# Patient Record
Sex: FEMALE | Race: WHITE | NOT HISPANIC OR LATINO | URBAN - METROPOLITAN AREA
[De-identification: names, ages, dates, MRNs, and addresses within clinical notes are randomized per-mention and may not be internally consistent; named-entity substitution may affect disease eponyms.]

---

## 2018-01-02 ENCOUNTER — OFFICE VISIT CONVERTED (OUTPATIENT)
Dept: ONCOLOGY | Facility: HOSPITAL | Age: 58
End: 2018-01-02
Attending: NURSE PRACTITIONER

## 2018-01-18 ENCOUNTER — OFFICE VISIT CONVERTED (OUTPATIENT)
Dept: ONCOLOGY | Facility: HOSPITAL | Age: 58
End: 2018-01-18

## 2018-01-25 ENCOUNTER — OFFICE VISIT CONVERTED (OUTPATIENT)
Dept: CARDIOLOGY | Facility: CLINIC | Age: 58
End: 2018-01-25
Attending: INTERNAL MEDICINE

## 2018-02-01 ENCOUNTER — OFFICE VISIT CONVERTED (OUTPATIENT)
Dept: ONCOLOGY | Facility: HOSPITAL | Age: 58
End: 2018-02-01
Attending: NURSE PRACTITIONER

## 2018-02-15 ENCOUNTER — OFFICE VISIT CONVERTED (OUTPATIENT)
Dept: ONCOLOGY | Facility: HOSPITAL | Age: 58
End: 2018-02-15
Attending: NURSE PRACTITIONER

## 2018-03-19 ENCOUNTER — CONVERSION ENCOUNTER (OUTPATIENT)
Dept: FAMILY MEDICINE CLINIC | Facility: CLINIC | Age: 58
End: 2018-03-19

## 2018-03-19 ENCOUNTER — OFFICE VISIT CONVERTED (OUTPATIENT)
Dept: FAMILY MEDICINE CLINIC | Facility: CLINIC | Age: 58
End: 2018-03-19
Attending: NURSE PRACTITIONER

## 2018-03-29 ENCOUNTER — OFFICE VISIT CONVERTED (OUTPATIENT)
Dept: OTHER | Facility: HOSPITAL | Age: 58
End: 2018-03-29
Attending: SURGERY

## 2018-04-18 ENCOUNTER — TELEPHONE CONVERTED (OUTPATIENT)
Dept: ONCOLOGY | Facility: HOSPITAL | Age: 58
End: 2018-04-18

## 2018-04-20 ENCOUNTER — OFFICE VISIT CONVERTED (OUTPATIENT)
Dept: ONCOLOGY | Facility: HOSPITAL | Age: 58
End: 2018-04-20
Attending: INTERNAL MEDICINE

## 2018-05-09 ENCOUNTER — OFFICE VISIT CONVERTED (OUTPATIENT)
Dept: ONCOLOGY | Facility: HOSPITAL | Age: 58
End: 2018-05-09
Attending: INTERNAL MEDICINE

## 2018-05-10 ENCOUNTER — OFFICE VISIT CONVERTED (OUTPATIENT)
Dept: OTHER | Facility: HOSPITAL | Age: 58
End: 2018-05-10
Attending: SURGERY

## 2018-05-21 ENCOUNTER — TELEPHONE CONVERTED (OUTPATIENT)
Dept: ONCOLOGY | Facility: HOSPITAL | Age: 58
End: 2018-05-21

## 2018-05-23 ENCOUNTER — OFFICE VISIT CONVERTED (OUTPATIENT)
Dept: SURGERY | Facility: CLINIC | Age: 58
End: 2018-05-23
Attending: SURGERY

## 2018-05-25 ENCOUNTER — OFFICE VISIT CONVERTED (OUTPATIENT)
Dept: CARDIOLOGY | Facility: CLINIC | Age: 58
End: 2018-05-25
Attending: INTERNAL MEDICINE

## 2018-05-25 ENCOUNTER — TELEPHONE CONVERTED (OUTPATIENT)
Dept: ONCOLOGY | Facility: HOSPITAL | Age: 58
End: 2018-05-25

## 2018-05-31 ENCOUNTER — OFFICE VISIT CONVERTED (OUTPATIENT)
Dept: ONCOLOGY | Facility: HOSPITAL | Age: 58
End: 2018-05-31
Attending: INTERNAL MEDICINE

## 2018-05-31 ENCOUNTER — OFFICE VISIT CONVERTED (OUTPATIENT)
Dept: SURGERY | Facility: CLINIC | Age: 58
End: 2018-05-31
Attending: SURGERY

## 2021-05-16 VITALS
HEART RATE: 88 BPM | DIASTOLIC BLOOD PRESSURE: 66 MMHG | BODY MASS INDEX: 44.26 KG/M2 | SYSTOLIC BLOOD PRESSURE: 98 MMHG | HEIGHT: 67 IN | WEIGHT: 282 LBS

## 2021-05-16 VITALS — WEIGHT: 272 LBS | BODY MASS INDEX: 42.69 KG/M2 | HEIGHT: 67 IN | RESPIRATION RATE: 16 BRPM

## 2021-05-16 VITALS
DIASTOLIC BLOOD PRESSURE: 40 MMHG | HEIGHT: 67 IN | OXYGEN SATURATION: 98 % | WEIGHT: 272.5 LBS | TEMPERATURE: 99 F | HEART RATE: 84 BPM | SYSTOLIC BLOOD PRESSURE: 108 MMHG | RESPIRATION RATE: 16 BRPM | BODY MASS INDEX: 42.77 KG/M2

## 2021-05-16 VITALS
HEIGHT: 67 IN | SYSTOLIC BLOOD PRESSURE: 90 MMHG | DIASTOLIC BLOOD PRESSURE: 60 MMHG | BODY MASS INDEX: 39.08 KG/M2 | HEART RATE: 88 BPM | WEIGHT: 249 LBS

## 2021-05-16 VITALS — RESPIRATION RATE: 18 BRPM | HEIGHT: 67 IN | WEIGHT: 249 LBS | BODY MASS INDEX: 39.08 KG/M2

## 2021-05-16 VITALS — WEIGHT: 273 LBS | BODY MASS INDEX: 42.85 KG/M2 | HEIGHT: 67 IN | RESPIRATION RATE: 18 BRPM

## 2021-05-28 VITALS
BODY MASS INDEX: 44.58 KG/M2 | OXYGEN SATURATION: 98 % | HEIGHT: 67 IN | RESPIRATION RATE: 16 BRPM | WEIGHT: 293 LBS | SYSTOLIC BLOOD PRESSURE: 122 MMHG | OXYGEN SATURATION: 97 % | OXYGEN SATURATION: 96 % | BODY MASS INDEX: 45.99 KG/M2 | DIASTOLIC BLOOD PRESSURE: 50 MMHG | HEIGHT: 66 IN | HEART RATE: 76 BPM | SYSTOLIC BLOOD PRESSURE: 113 MMHG | HEIGHT: 67 IN | WEIGHT: 283.51 LBS | SYSTOLIC BLOOD PRESSURE: 114 MMHG | TEMPERATURE: 98.3 F | RESPIRATION RATE: 16 BRPM | HEART RATE: 120 BPM | DIASTOLIC BLOOD PRESSURE: 38 MMHG | TEMPERATURE: 98.1 F | TEMPERATURE: 98.5 F | BODY MASS INDEX: 45.56 KG/M2 | DIASTOLIC BLOOD PRESSURE: 54 MMHG | HEART RATE: 84 BPM

## 2021-05-28 VITALS
OXYGEN SATURATION: 98 % | BODY MASS INDEX: 45.64 KG/M2 | HEIGHT: 67 IN | HEART RATE: 78 BPM | SYSTOLIC BLOOD PRESSURE: 104 MMHG | WEIGHT: 290.79 LBS | TEMPERATURE: 98 F | DIASTOLIC BLOOD PRESSURE: 52 MMHG | RESPIRATION RATE: 16 BRPM

## 2021-05-28 VITALS
HEIGHT: 66 IN | OXYGEN SATURATION: 95 % | DIASTOLIC BLOOD PRESSURE: 53 MMHG | TEMPERATURE: 97.6 F | SYSTOLIC BLOOD PRESSURE: 104 MMHG | BODY MASS INDEX: 42.84 KG/M2 | TEMPERATURE: 99.1 F | HEIGHT: 66 IN | DIASTOLIC BLOOD PRESSURE: 54 MMHG | HEART RATE: 69 BPM | BODY MASS INDEX: 39.9 KG/M2 | TEMPERATURE: 97.6 F | HEART RATE: 82 BPM | OXYGEN SATURATION: 99 % | HEART RATE: 71 BPM | WEIGHT: 266.54 LBS | WEIGHT: 248.24 LBS | BODY MASS INDEX: 44.29 KG/M2 | OXYGEN SATURATION: 96 % | SYSTOLIC BLOOD PRESSURE: 105 MMHG | HEIGHT: 66 IN | DIASTOLIC BLOOD PRESSURE: 50 MMHG | WEIGHT: 275.57 LBS | SYSTOLIC BLOOD PRESSURE: 114 MMHG

## 2021-05-28 NOTE — H&P
"Patient: AFTAB BALTAZAR     Acct: [id number]     Report: #[id number]  UNIT #: [id number]     : 1960    Encounter Date:2018  PRIMARY CARE: LUCIANA LIANG [facility]  ***Signed***  --------------------------------------------------------------------------------------------------------------------  Chief Complaint      2018      LEFT BREAST CANCER HORMONE RECEPTOR POSITIVE, HER 2 LEIF NEGATIVE.            Vitals      Height 66.69 in / 169.39 cm      Weight 290 lbs 12.588 oz / 131.9 kg      BSA 2.56 m2      BMI 46.0 kg/m2      Temperature 98.0 F / 36.67 C - Temporal      Pulse 78      Respirations 16      Blood Pressure 104/52      Pulse Oximetry 98%, ROOM AIR            General Information      Referring Provider:  VANESSA LONGORIA [facility]      Primary Provider:  LUCIANA LIANG [facility]            Allergies      Coded Allergies:             Cantaloupe (Verified  Allergy, Unknown, ANAPHALAXIS, 1/3/18)           SHELLFISH DERIVED (Verified  Allergy, Unknown, HIVES, 1/3/18)       ONLY CERTAIN SHELLFISH- CAN USE IV DYE/CONTRAST PER PT           SULFA (SULFONAMIDE ANTIBIOTICS) (Verified  Allergy, Unknown, \"IT TAKES MY     SKIN OFF\", 1/3/18)           GLIPIZIDE (Verified  Adverse Reaction, Mild, NAUSEA, 1/3/18)            Medications      Last Reconciled on 18 12:00 by CELESTINO DOMINGO MD      Dexamethasone (Decadron) 4 Mg Tablet      8 MG PO BID, TAB         Reported         18       Calcium Carbonate (Calcium Antacid) 1,000 Mg Tab.chew      1000 MG PO QDAY Y for HEARTBURN, TAB.CHEW         Reported         18       Prochlorperazine Maleate (Prochlorperazine Maleate) 10 Mg Tab      10 MG PO Q6H Y for NAUSEA AND/OR VOMITING, #60 TAB 3 Refills         Prov: Celestino Domingo         17       Hydrocodone/Acetaminophen 5/325 MG (Norco 5/325 Mg) 1 Tab Tab      2 TAB PO Q4-6H Y for pain, #10 TAB 0 Refills         Prov: Margarita Lara         17       Warfarin Sod (Coumadin*) 2 Mg Tablet "      2 MG PO QDAY, #30 TAB 0 Refills         Reported         12/14/17       Pantoprazole (Protonix*) 40 Mg Tablet.dr      40 MG PO QDAY@07, #30 TAB 0 Refills         Reported         12/14/17       Lovastatin (Lovastatin) 40 Mg Tablet      40 MG PO QDAY for 30 Days, #30 TAB         Reported         12/14/17       Morphine Sulfate (MS CONTIN) 15 Mg Tablet.er      15 MG PO Q12HR, TAB.ER         Reported         12/14/17       Ondansetron HCl (Zofran) 8 Mg Tablet      8 MG PO Q8H Y for NAUSEA, #30 TAB 3 Refills         Prov: IzzyCelestino caba         12/5/17       rOPINIRole HCl (Requip) 0.25 Mg Tab      0.25 MG PO HS for 30 Days, #30 TAB         Reported         11/30/17       Hydrocodone/Acetaminophen 7.5/325 MG (Norco 7.5/325 Mg) 1 Tab Tab      1 TAB PO Q4-6H Y for BREAKTHROUGH PAIN, TAB 0 Refills         Reported         11/30/17       Furosemide* (Lasix*) 40 Mg Tablet      60 MG PO QDAY, #30 TAB 0 Refills         Reported         11/30/17       MDI-Albuterol (Ventolin HFA*) Unknown Strength Hfa.aer.ad      INH Q4-6H Y for SHORTNESS OF BREATH, #1 MDI 0 Refills         Reported         9/25/17       Meclizine Hcl (Meclizine*) 25 Mg Tablet      25 MG PO Q8 Y for VERTIGO, #30 TAB 1 Refill         Prov: CHRISTAL SPANGLER         8/20/17       Docusate Sodium (Colace) 100 Mg Capsule      100 MG PO BID, #60 CAP 0 Refills         Prov: STEVEN WINSTON         5/31/17       Liraglutide (Victoza 3-Bereket) 0.6 Mg/0.1 Ml Pen.injctr      1.8 MG SUBQ QDAY, PACKAGE         Reported         5/30/17       sitaGLIPtin (Januvia*) 50 Mg Tablet      50 MG PO C BK, TAB         Reported         11/28/16       lamoTRIgine (lamoTRIgine*) 150 Mg Tablet      150 MG PO BID, #30 TAB 0 Refills         Reported         11/28/16       Febuxostat (Uloric*) 40 Mg Tab      40 MG PO QDAY, TAB         Reported         10/13/16       Ascorbic Acid (Vitamin C*) 500 Mg Tablet      500 MG PO QDAY, #60 TAB 0 Refills         Reported         3/1/16        Cholecalciferol (Vitamin D3*) 1,000 Units Capsule      1000 UNITS PO QDAY, #30 CAP 0 Refills         Reported         3/1/16       Magnesium Oxide (Magnesium Oxide*) 400 Mg Tablet      400 MG PO BID, #60 TAB 1 Refill         Prov: ARDEN NUNEZ         1/30/16       Pentoxifylline (Pentoxifylline) 400 Mg Tabcr      400 MG PO TID, #90         Reported         8/31/15       Gabapentin (Gabapentin) 600 Mg Tablet      600 MG PO BID, #60         Reported         8/31/15       Aspirin EC (Aspirin EC*) 81 Mg Tabec      81 MG PO HS, #90         Reported         8/31/15       Metformin Hcl (Metformin Hcl*) 1,000 Mg Tablet      1000 MG PO BID, #180         Reported         8/31/15       Isosorbide Mononitrate ER (Isosorbide Mononitrate ER) 60 Mg Tab.er.24h      60 MG PO QAM, #30         Reported         8/31/15       Spironolactone (Spironolactone*) 25 Mg Tablet      25 MG PO QDAY, #30         Reported         8/31/15       Metoprolol Tartrate (Metoprolol Tartrate*) 100 Mg Tablet      100 MG PO BID, #60         Reported         8/31/15       Lisinopril* (Lisinopril*) 10 Mg Tablet      10 MG PO QDAY, #30         Reported         8/31/15      Current Medications      Current Medications Reviewed 1/18/18            Chemo Status      On Oral Chemotherapy?:  No            Other      Clinical Trial Participant?:  No            Past Medical History      Yes Diabetes Type 2, Yes COPD      Hematology/oncology:  REPORTS HX OF: Other cancer history (gull bladder)            Past Surgical History      REPORTS HX OF: Thyroid surgery, Appendectomy, Lumpectomy (left), Other Past     Surgical Hx (right knee surgery)            Family History      REPORTS HX OF: Colorectal cancer (uncle), Kidney Cancer (uncle), Lung cancer (    uncle/aunt)            Social History      Yes            Tobacco Use      Tobacco status:  Former smoker      Smoking history:              Alcohol Use      Alcohol intake:  rarely      Counseling given:  None           "  Substance Use      Substance use:  Denies use            Past Oncology Illness History      Chief complaint: Breast cancer             Ms. Juana Stinson is a pleasant 56 year old  female recently diagnosed     with breast cancer. On 11/08/17 she had a screening mammogram which resulted as     abnormal. This lead to an ultrasound guided biopsy. The core needle biopsy on 11 /16/2016 returned as a grade 3 invasive ductal carcinoma, ER+ 10%, MI+ 100%, HER    -2/negin 2+ (reflex FISH negative), with suspicion for lymphatic invasion; no Ki-    67 was available. She states that her left breast mass is extremely painful due     to the mass.             She has a history of CHF and A fib. Echocardiogram in August 2017 showed normal     EF and function.            I have been asked to consult on her breast cancer seeing her for the first time     on 11/30/17.            She was discussed and seen at the breast multidisciplinary clinic on 11/30/17.             Interim History: 1/2/18      Presents for cycle 1 of DDAC + Taxol.  Side effects of chemotherapy discussed.      Has antiemetics and discussed how to use.  Pain in left breast continues.      Taking Morphine.             Interim History: 1/18/18      Presents for cycle 2 of DDAC + Taxol. Nausea controlled with medicine. Left     breast pain has now \"moved\" per the patient is at the inframammary fold. No     fever. No headache. No diarrhea or rash.            ROS      General:  Denies: Fever      Eyes:  Denies: Vision loss      Ears, nose, mouth, throat:  Denies: Headache      Cardiovascular:  Denies: Chest pain      Respiratory:  Denies: Shortness of Air      Gastrointestinal:  Denies: Nausea, Vomiting      Kidney/Bladder:  Denies: Painful Urination      Musculoskeletal:  Complains of: Other (pain at inframammary fold), Denies:     Swollen Joints      Skin:  DENIES: Jaundice      Neurological:  Denies: Dizziness      Psychiatric:  Complains of: AAO X 3    "   Hematologic/lymphatic:  Denies: Bruising, Bleeding            Vitals            Vital Signs              Date Time  Temp Pulse Resp B/P (MAP) Pulse Ox O2 Delivery O2 Flow Rate FiO2             1/2/18 11:51 98.1 84 16 113/50 96 ROOM AIR              General Appearance:  Alert, Oriented X3, No acute distress, Obese      Eyes:  Anicteric Sclerae, Moist Conjunctiva      HEENT:  Orophraynx clear, No Erythema      Respiratory:  CTAB, Diminished Breath      Abdomen\Gastro:  Soft, No Masses      Cardio:  RRR, No Murmur, No, Peripheral Edema      Skin:  Normal Temperature, Normal Tone, No Rash, lesions, ulcers      Psychiatric:  Appropriate Affect, AAO x3      Neuro:  Cranial Nerve II-XII Inta, No Focal Sensory Deficit      Muscularskeletal:  Normal Gait and Station, Full ROM of extremeties      Extremities:  No Digital Cyanosis, No Digital Ischemia      Lymphatic:  No Cervical, No Supraclavicular, No Axillary            Lab Results      Laboratory Tests      1/2/18 10:54            Current Plan      It was a pleasure meeting Ms. Stinson and I appreciate the opportunity to     participate in her care. I reviewed and summarized her pathology and outside     records as noted. I reviewed the results of her breast imaging studies as well     as her pathology from the core needle biopsy. This biopsy was positive for a     grade 3 invasive ductal carcinoma, ER+ 10%, NV+ 100%, HER2 neg (FISH negative).     I note that she had suspicion for lymphatic involvement on biopsy.            I reviewed with her the most recent NCCN Invasive Breast Cancer Version 3.2017     Guidelines. Obviously in this setting this is concerning for possible     metastatic disease due to the size of the mass and the presence of possible     lymphatic invasion. Therefore, I have recommended that we perform a chest CT     for additional evaluation as she has had previous imaging. Additional biopsies     may be indicated to further stage her cancer based on  these imaging studies. I     counseled her as noted below with the assumption that she did not have widely     metastatic disease based on the result of the upcoming imaging.            Given the grade and the size of her mass consideration can be made for     neoadjuvant chemotherapy. I counseled her on the numerous benefits to receiving     chemotherapy in the neoadjuvant setting per the NCCN recommendations including     facilitation of breast conservation if indicated, rendering inoperable tumors     operable, providing prognostic information added individual patient level based     on response to therapy, allowing time for genetic testing, may allow sentinel     lymph node biopsy alone if the axilla is clear to therapy, may provide an     opportunity to modify systemic treatment if no preoperative therapy response or     progression of disease is noted, and may allow for smaller radiotherapy ports     or less radiotherapy if axillary abiola disease is cleared. She voices     understanding of these benefits and was in agreement.            I then counseled her dose dense Adriamycin and Cytoxan followed by Taxol once I     reviewed her last echocardiogram with an ejection fraction of 65%  I reviewed     with her the potential risks and complications of this regimen including the     increased risk of death while receiving treatment and she voiced understanding     of these risk and was in agreement. In preparation for chemotherapy she will be     referred for a Port-A-Cath and echocardiogram as we will be utilizing a     cardiotoxic drug.               I counseled her that at the conclusion of chemotherapy and resection she would     likely require radiation assuming a lumpectomy is performed. She would     definitely benefit from endocrine therapy at the conclusion of these treatments     based on the ER+/ND+ disease.            We will order a pre-treatment breast MRI. We would repeat the breast MRI post      treatment prior to consideration of resection.            Current Plan: 1/2/18      Proceed with C1 of DDAC + Taxol.  MRI results reviewed with patient large 8 cm     mass in lower inner quadrant of left breast with 2.5 cm linear and ductal non-    mass enhancement extends from the mass to left nipple and enlarged left     axillary lymph node.  Again plan to MRI after treatment.  Side effects     discussed.  Labs reviewed.  Will start with toxicity monitoring.  Will follow     up in 2 weeks.             Current Plan: 1/18/18      Proceed with C2 of DDAC + Taxol. Toxicity monitoring. MRI results again     reviewed with patient large 8 cm mass in lower inner quadrant of left breast     with 2.5 cm linear and ductal non-mass enhancement extends from the mass to     left nipple and enlarged left axillary lymph node. I counseled her that     clinically this appeared consistent with Stage IIIA (T3 N1) disease. Will     repeat MRI after treatment. Labs reviewed. Toxicity monitoring. Will perform     breast exam at next appointment.            Her morbid obesity complicates all aspects of care.            She was given time to ask questions and these questions were answered in turn.             She had no additional questions or concerns and all questions were answered to     her satisfaction.            Available for immediate release. Please forward a copy of this dictation to Dr. Margarita Lara .             Breast cancer, stage 3 - C50.919            Abnormal MRI, breast - R92.8            HER2-negative carcinoma of left breast - C50.912            ER+ [place]+ carcinoma of breast - C50.919, Z17.0            Obesities, morbid - E66.01      Follow-up:  2 Weeks      Next Visit Labs:  CBC, CMP      Intensive Monitor for Toxicity:  Labs Reviewed, Chemo Orders Reviewd, Meds\    Narcotics Reviewed      Labs Reviewed During Visit?:  Yes      Review/Other:  Received Lab Test, Ordered Lab Test, Reviewed Radiology Test,      Reviewed Medicine Test, Reviewed Image Tracing or Specimen      ECOG Score:  0            Hx Influenza Vaccination:  Yes (2017)      Date Influenza Vaccine Given:  Nov 1, 2017      Hx Pneumococcal Vaccination:  Yes (UTD)                 Disclaimer: Converted document may not contain table formatting or lab diagrams. Please see [employer] for the authenticated document.

## 2021-05-28 NOTE — H&P
"Patient: AFTAB BALTAZAR     Acct: [id number]     Report: #[id number]  UNIT #: [id number]     : 1960    Encounter Date:2018  PRIMARY CARE: LUCIANA LIANG [facility]  ***Signed***  --------------------------------------------------------------------------------------------------------------------  Chief Complaint      2018      LEFT BREAST CANCER HORMONE RECEPTOR POSITIVE, HER 2 LEIF NEGATIVE.            Vitals      Height 66.69 in / 169.39 cm      Weight 298 lbs 4.518 oz / 135.3 kg      BSA 2.60 m2      BMI 47.2 kg/m2      Temperature 98.1 F / 36.72 C - Temporal      Pulse 84      Respirations 16      Blood Pressure 113/50      Pulse Oximetry 96%, ROOM AIR            General Information      Referring Provider:  VANESSA LONGORIA [facility]      Primary Provider:  LUCIANA LIANG [facility]            Allergies      Coded Allergies:             Cantaloupe (Verified  Allergy, Unknown, ANAPHALAXIS, 18)           SHELLFISH DERIVED (Verified  Allergy, Unknown, HIVES, 18)       ONLY CERTAIN SHELLFISH- CAN USE IV DYE/CONTRAST PER PT           SULFA (SULFONAMIDE ANTIBIOTICS) (Verified  Allergy, Unknown, \"IT TAKES MY     SKIN OFF\", 18)           GLIPIZIDE (Verified  Adverse Reaction, Mild, NAUSEA, 18)            Medications      Last Reconciled on 17 19:47 by CELESTINO DOMINGO MD      Dexamethasone (Decadron) 4 Mg Tablet      8 MG PO BID, TAB         Reported         18       Calcium Carbonate (Calcium Antacid) 1,000 Mg Tab.chew      1000 MG PO QDAY Y for HEARTBURN, TAB.CHEW         Reported         18       Prochlorperazine Maleate (Prochlorperazine Maleate) 10 Mg Tab      10 MG PO Q6H Y for NAUSEA AND/OR VOMITING, #60 TAB 3 Refills         Prov: Celestino Domingo         17       Hydrocodone/Acetaminophen 5/325 MG (Norco 5/325 Mg) 1 Tab Tab      2 TAB PO Q4-6H Y for pain, #10 TAB 0 Refills         Prov: Margarita Lara         17       Warfarin Sod (Coumadin*) 2 Mg Tablet  "     2 MG PO QDAY, #30 TAB 0 Refills         Reported         12/14/17       Pantoprazole (Protonix*) 40 Mg Tablet.dr      40 MG PO QDAY@07, #30 TAB 0 Refills         Reported         12/14/17       Lovastatin (Lovastatin) 40 Mg Tablet      40 MG PO QDAY for 30 Days, #30 TAB         Reported         12/14/17       Morphine Sulfate (MS CONTIN) 15 Mg Tablet.er      15 MG PO Q12HR, TAB.ER         Reported         12/14/17       Ondansetron HCl (Zofran) 8 Mg Tablet      8 MG PO Q8H Y for NAUSEA, #30 TAB 3 Refills         Prov: IzzyCelestino caba         12/5/17       rOPINIRole HCl (Requip) 0.25 Mg Tab      0.25 MG PO HS for 30 Days, #30 TAB         Reported         11/30/17       Hydrocodone/Acetaminophen 7.5/325 MG (Norco 7.5/325 Mg) 1 Tab Tab      1 TAB PO Q4-6H Y for BREAKTHROUGH PAIN, TAB 0 Refills         Reported         11/30/17       Furosemide* (Lasix*) 40 Mg Tablet      60 MG PO QDAY, #30 TAB 0 Refills         Reported         11/30/17       MDI-Albuterol (Ventolin HFA*) Unknown Strength Hfa.aer.ad      INH Q4-6H Y for SHORTNESS OF BREATH, #1 MDI 0 Refills         Reported         9/25/17       Meclizine Hcl (Meclizine*) 25 Mg Tablet      25 MG PO Q8 Y for VERTIGO, #30 TAB 1 Refill         Prov: CHRISTAL SPANGLER         8/20/17       Docusate Sodium (Colace) 100 Mg Capsule      100 MG PO BID, #60 CAP 0 Refills         Prov: STEVEN WINSTON         5/31/17       Liraglutide (Victoza 3-Bereket) 0.6 Mg/0.1 Ml Pen.injctr      1.8 MG SUBQ QDAY, PACKAGE         Reported         5/30/17       sitaGLIPtin (Januvia*) 50 Mg Tablet      50 MG PO C BK, TAB         Reported         11/28/16       lamoTRIgine (lamoTRIgine*) 150 Mg Tablet      150 MG PO BID, #30 TAB 0 Refills         Reported         11/28/16       Febuxostat (Uloric*) 40 Mg Tab      40 MG PO QDAY, TAB         Reported         10/13/16       Ascorbic Acid (Vitamin C*) 500 Mg Tablet      500 MG PO QDAY, #60 TAB 0 Refills         Reported         3/1/16        Cholecalciferol (Vitamin D3*) 1,000 Units Capsule      1000 UNITS PO QDAY, #30 CAP 0 Refills         Reported         3/1/16       Magnesium Oxide (Magnesium Oxide*) 400 Mg Tablet      400 MG PO BID, #60 TAB 1 Refill         Prov: ARDEN NUNEZ         1/30/16       Pentoxifylline (Pentoxifylline) 400 Mg Tabcr      400 MG PO TID, #90         Reported         8/31/15       Gabapentin (Gabapentin) 600 Mg Tablet      600 MG PO BID, #60         Reported         8/31/15       Aspirin EC (Aspirin EC*) 81 Mg Tabec      81 MG PO HS, #90         Reported         8/31/15       Metformin Hcl (Metformin Hcl*) 1,000 Mg Tablet      1000 MG PO BID, #180         Reported         8/31/15       Isosorbide Mononitrate ER (Isosorbide Mononitrate ER) 60 Mg Tab.er.24h      60 MG PO QAM, #30         Reported         8/31/15       Spironolactone (Spironolactone*) 25 Mg Tablet      25 MG PO QDAY, #30         Reported         8/31/15       Metoprolol Tartrate (Metoprolol Tartrate*) 100 Mg Tablet      100 MG PO BID, #60         Reported         8/31/15       Lisinopril* (Lisinopril*) 10 Mg Tablet      10 MG PO QDAY, #30         Reported         8/31/15      Current Medications      Current Medications Reviewed 1/2/18            Pain and Fatigue Scales      Pain Assessment:           Experiencing any pain?:  Yes         Pain Scale Used:  Numerical         Pain Location:  Breast         Description:  Sharp         Duration:  Continuous         Pain Comment:        TAKING MORPHINE      Fatigue:           Experiencing any fatigue?:  Yes         Fatigue (0-10 scale):  8            Chemo Status      On Oral Chemotherapy?:  No            Other      Clinical Trial Participant?:  No            Past Medical History      Yes Diabetes Type 2, Yes COPD      Hematology/oncology:  REPORTS HX OF: Other cancer history (gull bladder)            Past Surgical History      REPORTS HX OF: Thyroid surgery, Appendectomy, Lumpectomy (left), Other Past     Surgical Hx  (right knee surgery)            Family History      REPORTS HX OF: Colorectal cancer (uncle), Kidney Cancer (uncle), Lung cancer (    uncle/aunt)            Social History      Yes            Tobacco Use      Tobacco status:  Former smoker      Smoking history:              Alcohol Use      Alcohol intake:  rarely      Counseling given:  None            Substance Use      Substance use:  Denies use            Past Oncology Illness History      Chief complaint: Breast cancer             Ms. Juana Stinson is a pleasant 56 year old  female recently diagnosed     with breast cancer. On 11/08/17 she had a screening mammogram which resulted as     abnormal. This lead to an ultrasound guided biopsy. The core needle biopsy on 11 /16/2016 returned as a grade 3 invasive ductal carcinoma, ER+ 10%, IA+ 100%, HER    -2/negin 2+ (reflex FISH negative), with suspicion for lymphatic invasion; no Ki-    67 was available. She states that her left breast mass is extremely painful due     to the mass.             She has a history of CHF and A fib. Echocardiogram in August 2017 showed normal     EF and function.            I have been asked to consult on her breast cancer seeing her for the first time     on 11/30/17.            She was discussed and seen at the breast multidisciplinary clinic on 11/30/17.             Interim History: 1/2/18      Presents for cycle 1 of DDAC + Taxol.  Side effects of chemotherapy discussed.      Has antiemetics and discussed how to use.  Pain in left breast continues.      Taking Morphine.            ROS      General:  Complains of: Fatigue, Denies: Appetite change, Weight loss      Eyes:  Complains of: Corrective lenses, Denies: Blurred vision      Ears, nose, mouth, throat:  Denies: Headache, Seizures, Visual Changes      Cardiovascular:  Denies: Chest pain, Irregular heartbeat, Palpitations      Respiratory:  Denies: Chest pain, Shortness of Air, Productive cough      Gastrointestinal:   Denies: Nausea, Vomiting, Constipation, Diarrhea      Kidney/Bladder:  Denies: Painful Urination, Blood in urine      Musculoskeletal:  Denies: New Back pain, Muscle weakness      Skin:  DENIES: Easy Bleeding, Nail changes      Neurological:  Denies: Dizziness, Numbness\Tingling      Psychiatric:  Complains of: AAO X 3, Denies: Anxiety, Panic attacks      Endocrine:  Denies DiabetesThyroid Disorder      Hematologic/lymphatic:  Denies: Bruising, Bleeding, Enlarged Lymph Nodes      Reproductive:  Complains of Menopause, Denies Pregnant            Vitals            Vital Signs              Date Time  Temp Pulse Resp B/P (MAP) Pulse Ox O2 Delivery O2 Flow Rate FiO2             11/30/17 14:03 97.5 79  124/49 96 rm air              General Appearance:  Alert, Oriented X3, Cooperative, No acute distress      Eyes:  Anicteric Sclerae, Moist Conjunctiva, PERRLA      HEENT:  Orophraynx clear, No Erythema, No Exudates, No Pallor, No Thrush      Neck:  Supple, Full ROM, No Carotid Bruits      Respiratory:  CTAB, No Diminished Breath, No Crackels      Abdomen\Gastro:  Soft, No Masses, No No Hepatosplenomegaly      Cardio:  RRR, No Murmur, No, Peripheral Edema, Normal PMI      Skin:  Normal Temperature, Normal Tone, No Rash, lesions, ulcers      Psychiatric:  Appropriate Affect, Intact Judgement, AAO x3      Neuro:  Cranial Nerve II-XII Inta, No Focal Sensory Deficit      Muscularskeletal:  Normal Gait and Station, Full ROM of extremeties      Extremities:  No Digital Cyanosis, No Digital Ischemia, Normal Gait and station    , No Weakness      Lymphatic:  No Cervical, No Supraclavicular, No Infraclavicular, No Axillary            Lab Results      Laboratory Tests      1/2/18 10:54            Current Plan      It was a pleasure meeting Ms. Stinson and I appreciate the opportunity to     participate in her care. I reviewed and summarized her pathology and outside     records as noted. I reviewed the results of her breast imaging  studies as well     as her pathology from the core needle biopsy. This biopsy was positive for a     grade 3 invasive ductal carcinoma, ER+ 10%, DE+ 100%, HER2 neg (FISH negative).     I note that she had suspicion for lymphatic involvement on biopsy.            I reviewed with her the most recent NCCN Invasive Breast Cancer Version 3.2017     Guidelines. Obviously in this setting this is concerning for possible     metastatic disease due to the size of the mass and the presence of possible     lymphatic invasion. Therefore, I have recommended that we perform a chest CT     for additional evaluation as she has had previous imaging. Additional biopsies     may be indicated to further stage her cancer based on these imaging studies. I     counseled her as noted below with the assumption that she did not have widely     metastatic disease based on the result of the upcoming imaging.            Given the grade and the size of her mass consideration can be made for     neoadjuvant chemotherapy. I counseled her on the numerous benefits to receiving     chemotherapy in the neoadjuvant setting per the NCCN recommendations including     facilitation of breast conservation if indicated, rendering inoperable tumors     operable, providing prognostic information added individual patient level based     on response to therapy, allowing time for genetic testing, may allow sentinel     lymph node biopsy alone if the axilla is clear to therapy, may provide an     opportunity to modify systemic treatment if no preoperative therapy response or     progression of disease is noted, and may allow for smaller radiotherapy ports     or less radiotherapy if axillary abiola disease is cleared. She voices     understanding of these benefits and was in agreement.            I then counseled her dose dense Adriamycin and Cytoxan followed by Taxol once I     reviewed her last echocardiogram with an ejection fraction of 65%  I reviewed     with  her the potential risks and complications of this regimen including the     increased risk of death while receiving treatment and she voiced understanding     of these risk and was in agreement. In preparation for chemotherapy she will be     referred for a Port-A-Cath and echocardiogram as we will be utilizing a     cardiotoxic drug.               I counseled her that at the conclusion of chemotherapy and resection she would     likely require radiation assuming a lumpectomy is performed. She would     definitely benefit from endocrine therapy at the conclusion of these treatments     based on the ER+/FL+ disease.            We will order a pre-treatment breast MRI. We would repeat the breast MRI post     treatment prior to consideration of resection.            Current Plan: 1/2/18      Proceed with C1 of DDAC + Taxol.  MRI results reviewed with patient large 8 cm     mass in lower inner quadrant of left breast with 2.5 cm linear and ductal non-    mass enhancement extends from the mass to left nipple and enlarged left     axillary lymph node.  Again plan to MRI after treatment.  Side effects     discussed.  Labs reviewed.  Will start with toxicity monitoring.  Will follow     up in 2 weeks.             She was given time to ask questions and these questions were answered in turn.             She had no additional questions or concerns and all questions were answered to     her satisfaction.            Available for immediate release. Please forward a copy of this dictation to Dr. Margarita Lara .             ER+ FL+ carcinoma of breast       Carcinoma of left breast, estrogen and progesterone receptor positive - C50.912    , Z17.0       Laterality: left            HER2-negative carcinoma of left breast - C50.912            Cancer related pain - G89.3      Follow-up:  2 Weeks      Next Visit Labs:  CBC, CMP      Intensive Monitor for Toxicity:  Labs Reviewed, Chemo Orders Reviewd, Meds\    Narcotics Reviewed       Labs Reviewed During Visit?:  Yes      Review/Other:  Received Lab Test, Ordered Lab Test, Reviewed Radiology Test,     Reviewed Medicine Test      ECOG Score:  0      Attending      See the above note for details. I saw and evaluated the patient and agree with     the NP's findings and plan as documented. I reviewed the review of systems and     past histories. I reviewed the patient's case and plan with the NP as noted.     She presents to initiate treatment. We will proceed with chemotherapy. Breast     MRI reviewed as noted.            Hx Influenza Vaccination:  Yes (2017)      Date Influenza Vaccine Given:  Nov 1, 2017      Hx Pneumococcal Vaccination:  Yes (UTD)                 Disclaimer: Converted document may not contain table formatting or lab diagrams. Please see [employer] for the authenticated document.

## 2021-05-28 NOTE — H&P
"Patient: AFTAB BALTAZAR     Acct: [id number]     Report: #[id number]  UNIT #: [id number]     : 1960    Encounter Date:2018  PRIMARY CARE: LUCIANA LIANG [facility]  ***Signed***  --------------------------------------------------------------------------------------------------------------------  Chief Complaint      2018      LEFT BREAST CANCER HORMONE RECEPTOR POSITIVE, HER 2 LEIF NEGATIVE.            Vitals      Height 66.69 in / 169.39 cm      Temperature 98.5 F / 36.94 C - Temporal      Pulse 76      Respirations 16      Blood Pressure 114/38      Pulse Oximetry 98%, ROOM AIR            General Information      Referring Provider:  VANESSA LONGORIA [facility]      Primary Provider:  LUCIANA LIANG [facility]            Allergies      Coded Allergies:             Cantaloupe (Verified  Allergy, Unknown, ANAPHALAXIS, 18)           SHELLFISH DERIVED (Verified  Allergy, Unknown, HIVES, 18)       ONLY CERTAIN SHELLFISH- CAN USE IV DYE/CONTRAST PER PT           SULFA (SULFONAMIDE ANTIBIOTICS) (Verified  Allergy, Unknown, \"IT TAKES MY     SKIN OFF\", 18)           GLIPIZIDE (Verified  Adverse Reaction, Mild, NAUSEA, 18)            Medications      Last Reconciled on 18 12:00 by CELESTINO DOMINGO MD      OLANZapine (ZyPREXA*) 10 Mg Tablet      10 MG PO HS for 30 Days, #30 TAB         Reported         18       Docusate Sodium (Colace) 100 Mg Capsule      100 MG PO BID Y for CONSTIPATION, #60 CAP 0 Refills         Reported         18       Dexamethasone (Decadron) 4 Mg Tablet      8 MG PO ASDIR, TAB         Reported         18       Calcium Carbonate (Calcium Antacid) 1,000 Mg Tab.chew      1000 MG PO QDAY Y for HEARTBURN, TAB.CHEW         Reported         18       Prochlorperazine Maleate (Prochlorperazine Maleate) 10 Mg Tab      10 MG PO Q6H Y for NAUSEA AND/OR VOMITING, #60 TAB 3 Refills         Prov: Celestino Domingo         17       Warfarin Sod (Coumadin*) 2 Mg " Tablet      2 MG PO QDAY, #30 TAB 0 Refills         Reported         12/14/17       Pantoprazole (Protonix*) 40 Mg Tablet.dr      40 MG PO QDAY@07, #30 TAB 0 Refills         Reported         12/14/17       Lovastatin (Lovastatin) 40 Mg Tablet      40 MG PO QDAY for 30 Days, #30 TAB         Reported         12/14/17       Morphine Sulfate (MS CONTIN) 15 Mg Tablet.er      15 MG PO Q12HR, TAB.ER         Reported         12/14/17       Ondansetron HCl (Zofran) 8 Mg Tablet      8 MG PO Q8H Y for NAUSEA, #30 TAB 3 Refills         Prov: IzzyrosalesCelestino         12/5/17       rOPINIRole HCl (Requip) 0.25 Mg Tab      0.25 MG PO HS for 30 Days, #30 TAB         Reported         11/30/17       Hydrocodone/Acetaminophen 7.5/325 MG (Norco 7.5/325 Mg) 1 Tab Tab      1 TAB PO Q4-6H Y for BREAKTHROUGH PAIN, TAB 0 Refills         Reported         11/30/17       Furosemide* (Lasix*) 40 Mg Tablet      60 MG PO QDAY, #30 TAB 0 Refills         Reported         11/30/17       MDI-Albuterol (Ventolin HFA*) Unknown Strength Hfa.aer.ad      8 GR INH Q4-6H Y for SHORTNESS OF BREATH, #1 MDI 0 Refills         Reported         9/25/17       Meclizine Hcl (Meclizine*) 25 Mg Tablet      25 MG PO Q8 Y for VERTIGO, #30 TAB 1 Refill         Prov: CHRISTAL SPANGLER         8/20/17       Liraglutide (Victoza 3-Bereket) 0.6 Mg/0.1 Ml Pen.injctr      1.8 MG SUBQ QDAY, PACKAGE         Reported         5/30/17       sitaGLIPtin (Januvia*) 50 Mg Tablet      50 MG PO C BK, TAB         Reported         11/28/16       lamoTRIgine (lamoTRIgine*) 150 Mg Tablet      150 MG PO BID, #30 TAB 0 Refills         Reported         11/28/16       Febuxostat (Uloric*) 40 Mg Tab      40 MG PO QDAY, TAB         Reported         10/13/16       Ascorbic Acid (Vitamin C*) 500 Mg Tablet      500 MG PO QDAY, #60 TAB 0 Refills         Reported         3/1/16       Cholecalciferol (Vitamin D3*) 1,000 Units Capsule      1000 UNITS PO QDAY, #30 CAP 0 Refills         Reported         3/1/16        Magnesium Oxide (Magnesium Oxide*) 400 Mg Tablet      400 MG PO BID, #60 TAB 1 Refill         Prov: ARDEN NUNEZ         1/30/16       Pentoxifylline (Pentoxifylline) 400 Mg Tabcr      400 MG PO TID, #90         Reported         8/31/15       Gabapentin (Gabapentin) 600 Mg Tablet      600 MG PO BID, #60         Reported         8/31/15       Aspirin EC (Aspirin EC*) 81 Mg Tabec      81 MG PO HS, #90         Reported         8/31/15       Metformin Hcl (Metformin Hcl*) 1,000 Mg Tablet      1000 MG PO BID, #180         Reported         8/31/15       Isosorbide Mononitrate ER (Isosorbide Mononitrate ER) 60 Mg Tab.er.24h      60 MG PO QAM, #30         Reported         8/31/15       Spironolactone (Spironolactone*) 25 Mg Tablet      25 MG PO QDAY, #30         Reported         8/31/15       Metoprolol Tartrate (Metoprolol Tartrate*) 100 Mg Tablet      100 MG PO BID, #60         Reported         8/31/15       Lisinopril* (Lisinopril*) 10 Mg Tablet      10 MG PO QDAY, #30         Reported         8/31/15      Current Medications      Current Medications Reviewed 2/1/18            Pain and Fatigue Scales      Pain Assessment:           Experiencing any pain?:  Yes         Pain Scale Used:  Numerical         Pain Location:  Breast         Description:  Aching         Duration:  Continuous      Fatigue:           Experiencing any fatigue?:  Yes         Fatigue (0-10 scale):  5            Chemo Status      On Oral Chemotherapy?:  No            Other      Clinical Trial Participant?:  No            Past Medical History      Yes Diabetes Type 2, Yes COPD      Hematology/oncology:  REPORTS HX OF: Other cancer history (gull bladder)            Past Surgical History      REPORTS HX OF: Thyroid surgery, Appendectomy, Lumpectomy (left), Other Past     Surgical Hx (right knee surgery)            Family History      REPORTS HX OF: Colorectal cancer (uncle), Kidney Cancer (uncle), Lung cancer (    uncle/aunt)            Social History     "  Yes            Tobacco Use      Tobacco status:  Former smoker      Smoking history:              Alcohol Use      Alcohol intake:  rarely      Counseling given:  None            Substance Use      Substance use:  Denies use            Past Oncology Illness History      Chief complaint: Breast cancer             Ms. Juana Stinson is a pleasant 56 year old  female recently diagnosed     with breast cancer. On 11/08/17 she had a screening mammogram which resulted as     abnormal. This lead to an ultrasound guided biopsy. The core needle biopsy on 11 /16/2016 returned as a grade 3 invasive ductal carcinoma, ER+ 10%, UT+ 100%, HER    -2/negin 2+ (reflex FISH negative), with suspicion for lymphatic invasion; no Ki-    67 was available. She states that her left breast mass is extremely painful due     to the mass.             She has a history of CHF and A fib. Echocardiogram in August 2017 showed normal     EF and function.            I have been asked to consult on her breast cancer seeing her for the first time     on 11/30/17.            She was discussed and seen at the breast multidisciplinary clinic on 11/30/17.             Interim History: 1/2/18      Presents for cycle 1 of DDAC + Taxol.  Side effects of chemotherapy discussed.      Has antiemetics and discussed how to use.  Pain in left breast continues.      Taking Morphine.             Interim History: 1/18/18      Presents for cycle 2 of DDAC + Taxol. Nausea controlled with medicine. Left     breast pain has now \"moved\" per the patient is at the inframammary fold. No     fever. No headache. No diarrhea or rash.            Interim History: 2/1/18      Presents for cycle 3 of DDAC.  Was at Dr. Amado's office last week for a regular     scheduled visit and experienced flashes of light, dizziness and midsternal     chest pain.  She was sent to the ER for evaluation and labs.  Her ANC at that     time was 70.  She received Neulasta after C2.  CT with PE " "protocol was obtained     and no pulmonary embolism was noted.  Was noted she had new bilateral new     pulmonary nodules 7mm or less.  Also noted was increase in size of left breast     mass now 8 cm versus 5 cm. (This was noted after reviewing ER note, no call     received from radiology).  Upon review of MRI of breast on 12/15/18 the left     breast mass was 8 cm.  She was discharged home after evaluation.  Today noted     with 13 pound weight loss and weakness.  \"No appetite\".  Will drink 1 maybe 2     Ensures per day and \"I get hungry at 10 pm and will have a snack\".  Has nausea     in the morning.  Not using zofran.  Continues with left breast pain.  No fever,     chills, diarrhea.            ROS      General:  Complains of: Appetite change, Fatigue, Weight loss      Eyes:  Complains of: Corrective lenses, Denies: Blurred vision      Ears, nose, mouth, throat:  Denies: Headache, Seizures, Visual Changes      Cardiovascular:  Denies: Chest pain, Irregular heartbeat, Palpitations      Respiratory:  Denies: Shortness of Air, Productive cough, Coughing blood      Gastrointestinal:  Complains of: Nausea, Denies: Vomiting, Constipation,     Diarrhea      Kidney/Bladder:  Denies: Painful Urination, Blood in urine      Musculoskeletal:  Denies: New Back pain, Muscle weakness      Skin:  DENIES: Easy Bleeding, Nail changes, Rash      Neurological:  Denies: Dizziness, Numbness\Tingling      Psychiatric:  Complains of: AAO X 3, Denies: Anxiety, Panic attacks      Endocrine:  Denies DiabetesDenies Thyroid Disorder      Hematologic/lymphatic:  Denies: Bruising, Bleeding, Enlarged Lymph Nodes      Reproductive:  Complains of Menopause, Denies Pregnant            Vitals            Vital Signs              Date Time  Temp Pulse Resp B/P (MAP) Pulse Ox O2 Delivery O2 Flow Rate FiO2             1/18/18 09:38 98.0 78 16 104/52 98 ROOM AIR              General Appearance:  Alert, Oriented X3, Cooperative, No acute distress    "   Eyes:  Anicteric Sclerae, Moist Conjunctiva, PERRLA      HEENT:  Orophraynx clear, No Erythema, No Exudates, No Pallor, No Thrush      Neck:  Supple, Full ROM, No Carotid Bruits      Respiratory:  CTAB, No Diminished Breath, No Crackels      Breast\Chest:  Masses, Lumps, Other (left breast mass appears unchanged, skin     involvement noted)      Abdomen\Gastro:  Soft, No Masses, No Hepatosplenomegaly      Cardio:  RRR, No Murmur, No, Peripheral Edema, Normal PMI      Skin:  Normal Temperature, Normal Tone, No Rash, lesions, ulcers      Psychiatric:  Appropriate Affect, Intact Judgement, AAO x3      Neuro:  Cranial Nerve II-XII Inta, No Focal Sensory Deficit      Muscularskeletal:  Normal Gait and Station, Full ROM of extremeties      Extremities:  No Digital Cyanosis, No Digital Ischemia, Normal Gait and station    , No Weakness      Lymphatic:  Axillary, No Cervical, No Supraclavicular, No Infraclavicular            Lab Results      Laboratory Tests      1/25/18 18:10            Laboratory Tests            Test        1/25/18      18:10             Magnesium Level        2.01 mg/dL      (1.60-2.30)            Current Plan      It was a pleasure meeting Ms. Stinson and I appreciate the opportunity to     participate in her care. I reviewed and summarized her pathology and outside     records as noted. I reviewed the results of her breast imaging studies as well     as her pathology from the core needle biopsy. This biopsy was positive for a     grade 3 invasive ductal carcinoma, ER+ 10%, FL+ 100%, HER2 neg (FISH negative).     I note that she had suspicion for lymphatic involvement on biopsy.            I reviewed with her the most recent NCCN Invasive Breast Cancer Version 3.2017     Guidelines. Obviously in this setting this is concerning for possible     metastatic disease due to the size of the mass and the presence of possible     lymphatic invasion. Therefore, I have recommended that we perform a chest CT      for additional evaluation as she has had previous imaging. Additional biopsies     may be indicated to further stage her cancer based on these imaging studies. I     counseled her as noted below with the assumption that she did not have widely     metastatic disease based on the result of the upcoming imaging.            Given the grade and the size of her mass consideration can be made for     neoadjuvant chemotherapy. I counseled her on the numerous benefits to receiving     chemotherapy in the neoadjuvant setting per the NCCN recommendations including     facilitation of breast conservation if indicated, rendering inoperable tumors     operable, providing prognostic information added individual patient level based     on response to therapy, allowing time for genetic testing, may allow sentinel     lymph node biopsy alone if the axilla is clear to therapy, may provide an     opportunity to modify systemic treatment if no preoperative therapy response or     progression of disease is noted, and may allow for smaller radiotherapy ports     or less radiotherapy if axillary abiola disease is cleared. She voices     understanding of these benefits and was in agreement.            I then counseled her dose dense Adriamycin and Cytoxan followed by Taxol once I     reviewed her last echocardiogram with an ejection fraction of 65%  I reviewed     with her the potential risks and complications of this regimen including the     increased risk of death while receiving treatment and she voiced understanding     of these risk and was in agreement. In preparation for chemotherapy she will be     referred for a Port-A-Cath and echocardiogram as we will be utilizing a     cardiotoxic drug.               I counseled her that at the conclusion of chemotherapy and resection she would     likely require radiation assuming a lumpectomy is performed. She would     definitely benefit from endocrine therapy at the conclusion of these  treatments     based on the ER+/ID+ disease.            We will order a pre-treatment breast MRI. We would repeat the breast MRI post     treatment prior to consideration of resection.            Her morbid obesity complicates all aspects of care.            Current Plan: 1/2/18      Proceed with C1 of DDAC + Taxol.  MRI results reviewed with patient large 8 cm     mass in lower inner quadrant of left breast with 2.5 cm linear and ductal non-    mass enhancement extends from the mass to left nipple and enlarged left     axillary lymph node.  Again plan to MRI after treatment.  Side effects     discussed.  Labs reviewed.  Will start with toxicity monitoring.  Will follow     up in 2 weeks.             Current Plan: 1/18/18      Proceed with C2 of DDAC + Taxol. Toxicity monitoring. MRI results again     reviewed with patient large 8 cm mass in lower inner quadrant of left breast     with 2.5 cm linear and ductal non-mass enhancement extends from the mass to     left nipple and enlarged left axillary lymph node. I counseled her that     clinically this appeared consistent with Stage IIIA (T3 N1) disease. Will     repeat MRI after treatment. Labs reviewed. Toxicity monitoring. Will perform     breast exam at next appointment.            Current Plan: 2/1/18      Review of ER medical records and imaging performed.  Patient was most likely     dehydrated, anemic and with significant amount of weight loss from the therapy.      This is what probably lead to the ER visit and her symptoms.  She has a     history of CHF and Atrial Fibrillation.  Discussed her weight loss and dietary     interventions to stave off further weight loss and dehydration.  She has     multiple comorbidities and multiple medications that may need to be adjusted     during her course of chemotherapy.  Recommended regular more frequent check ups     with cardiologist and PCP to co-manage her medications and comorbidities.     Recommended Compazine at  "bedtime and to take ODT Zofran prior to getting out of     bed to decrease nausea.              She was given time to ask questions and these questions were answered in turn.             She had no additional questions or concerns and all questions were answered to     her satisfaction.            Available for immediate release. Please forward a copy of this dictation to Dr. Margarita Lara .             Cancer related pain - G89.3            HER2-negative carcinoma of left breast - C50.912            ER+ AL+ carcinoma of breast       Carcinoma of left breast, estrogen and progesterone receptor positive - C50.912    , Z17.0       Laterality: left            Weight loss - R63.4            Dehydration - E86.0            Anemia associated with chemotherapy - D64.81, T45.1X5A      Follow-up:  2 Weeks      Next Visit Labs:  CBC, CMP      Intensive Monitor for Toxicity:  Labs Reviewed, Chemo Orders Reviewd, Meds\    Narcotics Reviewed      Labs Reviewed During Visit?:  Yes      Review/Other:  Received Lab Test, Ordered Lab Test, Reviewed Radiology Test,     Ordered Radiology Test, Reviewed Medicine Test, Obtained Old Records, Reviewed     and Summarized Old Records      ECOG Score:  0      Attending      See the above note for details. I saw and evaluated the patient and agree with     the NP's findings and plan as documented. I reviewed the review of systems and     past histories. I reviewed the patient's case and plan with the NP as noted. As     noted she presents for cycle 3 of chemotherapy. After cycle #2 she developed     \"halos\" and was found to have hypotension. She was given intravenous fluids and     this improved. She was seen in the ER and CT pulmonary protocol was negative     for any embolism but did show possible development of bilateral metastatic     nodules. She presents today and I have performed a breast exam which shows very     minimal to no improvement of the left breast after 2 cycles of dose " "dense AC     chemotherapy. After this cycle of chemotherapy we will initiate weekly Taxol     and monitor closely for response. In terms of her symptom of seeing \"halos\" I     recommended that she have a brain MRI performed to assess for any development     of intracranial metastatic disease. We will proceed with treatment otherwise as     noted above.            Hx Influenza Vaccination:  Yes (2017)      Date Influenza Vaccine Given:  Nov 1, 2017      Hx Pneumococcal Vaccination:  Yes (UTD)                 Disclaimer: Converted document may not contain table formatting or lab diagrams. Please see [employer] [facility] for the authenticated document.  "

## 2021-05-28 NOTE — PROGRESS NOTES
Patient: AFTAB BALTAZAR     Acct: [id number]     Report: #[id number]  UNIT #: [id number]     : 1960    Encounter Date:2018  PRIMARY CARE: LUCIANA LIANG [facility]  ***Signed***  --------------------------------------------------------------------------------------------------------------------  Chief Complaint      2018      BREAST CANCER      Intent of Therapy?:  Curative            Current Plan      -Metastatic breast cancer      -Grade 3 invasive ductal carcinoma, ER+ 10%, NE+ 100%, HER2 neg (FISH negative)    . I note that she had suspicion for lymphatic involvement on biopsy.      -Patient received dose dense AC and Weekly Taxol.  Cancer progressed very     rapidly with new lung lesions.        -Orders placed for Lung Biopsy to evaluate [id number] and molecular studies for     Caris.        -Since this an aggressive cancer with possible organ compromise (lung) with     innumberalbe metastatic lesions.  Patient will be treated aggressively with CMF     to control breast cancer.       -Orders placed for next line of systemic chemotherapy.             -Today's Interim Plan      -Orders placed for lung biopsy to evaluate for HER-2/negin status and molecular     studies      -Orders placed for CMF chemotherapy since patient has an aggressive cancer     which has potential to cause lung compromise.             Medical Evaluation of Chemotherapy Associated Toxicities Today      -Chemotherapy associated fatigue      -Patient's weakness has remained stable.      -Recommended Exercise.       -Continue close observation.      -Chemotherapy associated nausea      -Patient's nausea has remained stable.      -Currently on Anti-Nausea medications.       -Continue close observation.      -Chemotherapy associated anemia      -Does not require blood transfusion on today's visit.       -Transfuse blood when hemoglobin is less than 7.      -Continue close observation.      -Chemotherapy associated  thrombocytopenia      -Does not require platelet transfusion on today's visit.       -Transfuse platelet when platelet count < 20,000.       -Continue close observation.      -Anxiety due to cancer      -Patient's anxiety is well controlled today.       -Continue current management.       -Today's Assessment      -ECOG 1.       -Patient's imaging exams, blood tests, physicians' notes, and any new findings     since our last visit were reviewed today to reassess patient's medical     treatment plan. .       -Old medical records were reviewed and summarized in chronological order in the     HPI today to maintain an updated medical record.       -Patient's radiology imaging tests from our last visit were reviewed     independently by me by direct visualization of the images.        -Patients current lab tests and medications were carefully reviewed to evaluate     patient's current treatment plan today. Proceed with chemotherapy plan.       -Patient was advised to call us right away if there are any new symptoms for an     urgent visit for further evaluation. Patient voiced understanding and agreed to     do so.      Notes      New Medications      * Warfarin Sod (Coumadin*) 3 MG TABLET: 3 MG PO QDAY@16 #30       Replaced Warfarin Sod (Coumadin) 2 MG TABLET:       3.5 MG PO Q2D@16 #15      * Acetaminophen/Hydrocodone 10/325* (Hydrocodone/Acetaminophen 10/325*) 1 EACH     TABLET: 1 TAB PO Q4-6H PRN PAIN       Replaced Hydrocodone/Acetaminophen 7.5/325 MG (Norco 7.5/325 Mg) 1 TAB TAB:       1 TAB PO Q4-6H PRN BREAKTHROUGH PAIN      * ANASTROZOLE (Arimidex*) 1 MG TABLET: 1 MG PO QDAY #30      * PALBOCICLIB (Ibrance) 125 MG CAPSULE: 125 MG PO ASDIR 21 Days #21       Instructions: daily X 21 days, then 7 days off, repeat every 28 days.      Changed Medications      * MORPHINE SULFATE (MS CONTIN) 15 MG TABLET.ER: 15 MG PO TID       Instructions: 1 TAB      * Docusate Sodium (Colace) 100 MG CAPSULE: 100 MG PO PRN #60        Instructions: TAKES EVERY OTHER DAY.      New Diagnostics      * CA27-29, As Soon As Possible       Dx: HER2-negative carcinoma of left breast - C50.912      * CBC, As Soon As Possible       Dx: HER2-negative carcinoma of left breast - C50.912      * PTT, As Soon As Possible       Dx: HER2-negative carcinoma of left breast - C50.912      * PT / INR, As Soon As Possible       Dx: HER2-negative carcinoma of left breast - C50.912      * BIOPSY LUNG CT, As Soon As Possible       Dx: HER2-negative carcinoma of left breast - C50.912      Intensive Monitor for Toxicity:  Labs Reviewed, Chemo Orders Reviewd, Meds\    Narcotics Reviewed      Labs Reviewed During Visit?:  Yes      Patient Education Provided:  Yes (educated regarding exercise to increase     strength.  can exercise while sitting to prevent further falls.)      Clinical Staging      Stage III A (pulmonary nodules inconclusive)            ECOG      ECOG Performance Status:  0            History and Present Illness      Ms. Juana Stinson is a pleasant 56 year old  female recently diagnosed     with breast cancer. On 11/08/17 she had a screening mammogram which resulted as     abnormal. This lead to an ultrasound guided biopsy. The core needle biopsy on 11 /16/2016 returned as a grade 3 invasive ductal carcinoma, ER+ 10%, MS+ 100%, HER    -2/negin 2+ (reflex FISH negative), with suspicion for lymphatic invasion; no Ki-    67 was available. She states that her left breast mass is extremely painful due     to the mass.             She has a history of CHF and A fib. Echocardiogram in August 2017 showed normal     EF and function.            I have been asked to consult on her breast cancer seeing her for the first time     on 11/30/17.            She was discussed and seen at the breast multidisciplinary clinic on 11/30/17.             Interim History: 1/2/18      Presents for cycle 1 of DDAC + Taxol.  Side effects of chemotherapy discussed.      Has  "antiemetics and discussed how to use.  Pain in left breast continues.      Taking Morphine.             Interim History: 1/18/18      Presents for cycle 2 of DDAC + Taxol. Nausea controlled with medicine. Left     breast pain has now \"moved\" per the patient is at the inframammary fold. No     fever. No headache. No diarrhea or rash.            Interim History: 2/1/18      Presents for cycle 3 of DDAC.  Was at Dr. Amado's office last week for a regular     scheduled visit and experienced flashes of light, dizziness and midsternal     chest pain.  She was sent to the ER for evaluation and labs.  Her ANC at that     time was 70.  She received Neulasta after C2.  CT with PE protocol was obtained     and no pulmonary embolism was noted.  Was noted she had new bilateral new     pulmonary nodules 7mm or less.  Also noted was increase in size of left breast     mass now 8 cm versus 5 cm. (This was noted after reviewing ER note, no call     received from radiology).  Upon review of MRI of breast on 12/15/18 the left     breast mass was 8 cm.  She was discharged home after evaluation.  Today noted     with 13 pound weight loss and weakness.  \"No appetite\".  Will drink 1 maybe 2     Ensures per day and \"I get hungry at 10 pm and will have a snack\".  Has nausea     in the morning.  Not using zofran.  Continues with left breast pain.  No fever,     chills, diarrhea.             Interim History: 2/15/18      Noted with minimal change in breast after 3 cycles of DDAC.  Will change to     Taxol weekly.  Patient in agreement.  Weight has stabilized.  No fever, chills,     nausea, vomiting or increase in pain.              Interim History: 3/7/18      Presents today for #2 weekly Taxol.  Received 3 cycles of DDAC and discontinued     due to intolerance.  Recent admission 2/21/18 due to fall at home.  Discharged 3    /1/18.  Requiring O2 at all times since discharge.  Also using walker with     ambulation due to weakness.  Encouraged " "to exercise.  Labs reviewed: wbc 8.16,     ANC 6160, Hgb 9.50 and platelets 349,000.  Again discussed with patient and     daughter, lung nodules indicated on CT were indeterminate in nature.        Long discussion with patient and daughter that she was in poor health with many     other comorbidities prior to cancer diagnosis and chemotherapy.  This     complicates the picture.  Denies nausea, vomiting, fever or chills.             Interim History: 3/15/18      Presents for #3 Taxol.  Patient reports \"no noticeable difference in left     breast mass\".  Continues to have pain and asking for refill of Morphine Sulfate     ER.  Does not take twice a day or even daily.  Last filled in December 2017.      Tolerating Taxol much better.  Weight is up.  No recent falls.  Does have     nausea every morning.  Does not use antiemetics prior to bedtime.  Encouraged     its use at bedtime as preventive measure.  No fever, chills, vomiting or     increase in pain.              Interim History: 3/22/18      Presents for #4 Taxol.  States this chemotherapy (weekly Taxol) is less     fatiguing.  States \"pain is behind where breast tumor is.  No difference in     breast tumor size noted.\"   Has breast ultrasound later today to evaluate size.      Weight stable, appetite good.  Indicates fatigue improved. Labs reviewed:      wbc 4, hgb 9.1, platelets 205,000 and ANC 3240.  Denies fever, chills, nausea     or vomiting at this time. no worsening neuropathy.              Interim History: 3/29/18      Presents to review ultrasound of left breast cancer.  Patient had not noticed a     decrease in large mass.  Ultrasound showed minimal change in mass despite 3     cycles DDAC and 4 doses of Taxol.  After discussion with Dr. Nieves earlier this     week it was decided to proceed with surgery for removal of mass.  Patient will     meet with Dr. Lara today for surgical evaluation.   Labs reviewed today WBC     3.60, Hemoglobin 8.56, " "Hematocrit 25.0, Platelet count 333,000, ANC 2400. She     denies any recent fever, chills or s/s of infection.            Vitals      Height 5 ft 5.50 in / 166.37 cm      Weight 275 lbs 9.200 oz / 125.0 kg      BSA 2.47 m2      BMI 45.2 kg/m2      Temperature 99.1 F / 37.28 C - Temporal      Pulse 69      Blood Pressure 104/54 Sitting, Right Arm      Pulse Oximetry 99%, ROOM AIR            Allergies      Coded Allergies:             Cantaloupe (Verified  Allergy, Severe, ANAPHALAXIS, 4/20/18)           SULFA (SULFONAMIDE ANTIBIOTICS) (Verified  Allergy, Severe, \"IT TAKES MY     SKIN OFF\", 4/20/18)           SHELLFISH DERIVED (Verified  Allergy, Unknown, HIVES, 4/20/18)       ONLY CERTAIN SHELLFISH- CAN USE IV DYE/CONTRAST PER PT           GLIPIZIDE (Verified  Adverse Reaction, Mild, NAUSEA, 4/20/18)            Medications      Last Reconciled on 4/21/18 14:57 by ANGELES LONDONO MD      Palbociclib (Ibrance) 125 Mg Capsule      125 MG PO ASDIR for 21 Days, #21 CAP         Prov: Angeles Londono         4/20/18       Anastrozole (Arimidex*) 1 Mg Tablet      1 MG PO QDAY, #30 TAB 3 Refills         Prov: Angeles Londono         4/20/18       Acetaminophen/Hydrocodone 10/325* (Hydrocodone/Acetaminophen 10/325*) 1 Each     Tablet      1 TAB PO Q4-6H Y for PAIN, TAB         Reported         4/20/18       Warfarin Sod (Coumadin*) 3 Mg Tablet      3 MG PO QDAY@16, #30 TAB 0 Refills         Reported         4/20/18       Allopurinol (Zyloprim*) 100 Mg Tablet      PO QDAY for 30 Days, TAB 0 Refills         Reported         4/20/18       Furosemide* (Lasix*) 40 Mg Tablet      40 MG PO QDAY for 30 Days, #30 TAB 0 Refills         Prov: Karlene Ochoa         3/1/18       Docusate Sodium (Colace) 100 Mg Capsule      100 MG PO PRN, #60 CAP 0 Refills         Reported         1/18/18       Lovastatin (Lovastatin) 40 Mg Tablet      40 MG PO QDAY for 30 Days, #30 TAB         Reported         12/14/17       Morphine Sulfate (MS CONTIN) 15 " Mg Tablet.er      15 MG PO TID, TAB.ER         Reported         12/14/17       Ondansetron HCl (Zofran) 8 Mg Tablet      8 MG PO Q8H Y for NAUSEA, #30 TAB 3 Refills         Prov: Celestino Nieves         12/5/17       rOPINIRole HCl (Requip) 0.25 Mg Tab      0.25 MG PO HS for 30 Days, #30 TAB         Reported         11/30/17       MDI-Albuterol (Ventolin HFA*) 8 Gm Hfa.aer.ad      2 PUFF INH Q4HR Y for SHORTNESS OF BREATH, #1 MDI 0 Refills         Reported         9/25/17       Meclizine Hcl (Meclizine*) 25 Mg Tablet      25 MG PO Q8 Y for VERTIGO, #30 TAB 1 Refill         Prov: CHRISTAL SPANGLER         8/20/17       sitaGLIPtin (Januvia*) 50 Mg Tablet      50 MG PO C BK, TAB         Reported         11/28/16       lamoTRIgine (lamoTRIgine*) 150 Mg Tablet      150 MG PO BID, #30 TAB 0 Refills         Reported         11/28/16       Ascorbic Acid (Vitamin C*) 500 Mg Tablet      500 MG PO QDAY, #60 TAB 0 Refills         Reported         3/1/16       Cholecalciferol (Vitamin D3*) 1,000 Units Capsule      1000 UNITS PO QDAY, #30 CAP 0 Refills         Reported         3/1/16       Magnesium Oxide (Magnesium Oxide*) 400 Mg Tablet      400 MG PO BID, #60 TAB 1 Refill         Prov: NUNEZ,ARDEN         1/30/16       Pentoxifylline (Pentoxifylline) 400 Mg Tabcr      400 MG PO BID, #90         Reported         8/31/15       Gabapentin (Gabapentin) 600 Mg Tablet      600 MG PO BID, #60         Reported         8/31/15       Aspirin EC (Aspirin EC*) 81 Mg Tabec      81 MG PO HS, #90         Reported         8/31/15       Metformin Hcl (Metformin Hcl*) 1,000 Mg Tablet      1000 MG PO BID, #180         Reported         8/31/15       Isosorbide Mononitrate ER (Isosorbide Mononitrate ER) 60 Mg Tab.er.24h      60 MG PO QAM, #30         Reported         8/31/15       Spironolactone (Spironolactone*) 25 Mg Tablet      25 MG PO QDAY, #30         Reported         8/31/15       Metoprolol Tartrate (Metoprolol Tartrate*) 100 Mg Tablet      100 MG PO  BID, #60         Reported         8/31/15            General Information      Referring Provider:  VANESSA LONGORIA [facility]      Primary Provider:  LUCIANA LIANG [facility] Provider 2:  Deena Amado            Pain and Fatigue Scales      Pain Assessment:           Experiencing any pain?:  Yes         Pain Scale Used:  Numerical         Pain Intensity:  9         Pain Location:  Breast (LEFT)         Description:  Aching, Sharp         Duration:  Intermittent      Fatigue:           Experiencing any fatigue?:  Yes         Fatigue (0-10 scale):  3            Chemo Status      On Oral Chemotherapy?:  No            Other      Clinical Trial Participant?:  No            PAST, FAMILY   Past Medical History      No Diabetes Type 1, Yes Diabetes Type 2, No Thyroid Disease, Yes COPD, No     Emphysema, No Hypertension, No Stroke, No High Cholesterol, No Heart Attack, No     Bleeding Condition, No Low or High RBC Count, No Low or High WBC Count, No Low     or High Platelet Coun, No Hepatitis, No Kidney Disease, No Depression, No     Alzheimer's Disease, No Mental Disease, No Seizures, No Arthritis, No     Osteoporosis, No Osteopenia, No Short of Air, No Sleep apnea, No Liver Disease,     No STD, No Enlarged Prostate, No Other      Hematology/oncology:  REPORTS HX OF: Other cancer history (gull bladder),     DENIES HX OF: Previous Treatment for CA, Anemia, Bladder Cancer, Blood cancer,     Brain cancer, Breast cancer, Cervical cancer, Coagulopathy, Colorectal cancer,     Endocrine cancer, Eye cancer, GI cancer,  cancer, Kidney cancer, Leukemia,     Leukocytosis, Leukopenia, Liver cancer, Lung cancer, Lymphoma, Musculoskeletal     cancer, Myeloma, Neurologic cancer, Oral cancer, Ovarian cancer, Skin cancer,     Stomach cancer, Thrombocytopenia, Thyroid cancer, Uterine cancer, Other     hematologic history      Genetic/metabolic:  DENIES HX OF: Cystic fibrosis, Down syndrome, Other genetic     history, Other  metabolic history            Past Surgical History      REPORTS HX OF: Thyroid surgery, Appendectomy, Lumpectomy (left), Other Past     Surgical Hx (right knee surgery), DENIES HX OF: Cataract extraction, Lung biopsy    , CABG surgery, Coronary stent, Valve replacement, Cholecystectomy, Splenectomy    , Bladder surgery, Nephrectomy, Joint replacement, Frature repair, Skin cancer     removal, Melanoma excision, Spinal surgery, Breast biopsy, Mastectomy, bilateral    , Mastectomy, right, Mastectomy, left, Hysterectomy, Peg Tube Placement, VAD     Placement, Biopsy            Family History      REPORTS HX OF: Colorectal cancer (uncle), Kidney Cancer (uncle), Lung cancer (    uncle/aunt), DENIES HX OF: Anemia, Blood disorders, Blood Cancer, Breast cancer    , Cervical cancer, Coagulopathy, Endocrine Cancer, Eye Cancer, GI Cancer,      Cancer, Leukemia, Leukocytosis, Leukopenia, Liver Cancer, Lymphoma, Melanoma,     Musculoskeletal Cancer, Myeloma, Neurologic Cancer, Oral Cancer, Ovarian cancer    , Prostate cancer, Skin Cancer, Stomach Cancer, Testicular Cancer,     Thrombocytopenia, Thyroid cancer, Uterine cancer, Other Cancer History, Other     Hematology History            Social History      Yes            Tobacco Use      Tobacco status:  Former smoker      Smoking history:              Alcohol Use      Alcohol intake:  rarely      Counseling given:  None            Substance Use      Substance use:  Denies use            REVIEW OF SYSTEMS      General:  Complains of: Fatigue, Denies: Appetite change, Excessive sweating,     Fever, Night sweats, Weight gain, Weight loss, Other      Eyes:  Denies: Blurred vision, Corrective lenses, Diplopia, Eye irritation, Eye     pain, Eye redness, Spots in vision, Vision loss, Other      Ears, nose, mouth, throat:  Complains of: Headache, Denies: Seizures, Visual     Changes, Hearing loss, Sinus Congestion, Hoarseness, Sore throat, Other      Cardiovascular:  Denies: Chest  pain, Irregular heartbeat, Palpitations, Swollen     ankles/legs, Other      Respiratory:  Denies: Chest pain, Shortness of Air, Productive cough, Coughing     blood, Other      Gastrointestinal:  Complains of: Nausea, Denies: Vomiting, Problem swallowing,     Frequent heartburn, Constipation, Diarrhea, Tarry stools, Bloody stools, Unable     to control bowels, Other      Kidney/Bladder:  Denies: Painful Urination, Change in urinary stream, Blood in     urine, Incontinence, Frequent Urination, Decreased urine stream, Other      Musculoskeletal:  Denies: New Back pain, Leg Cramps, Painful Joints, Swollen     Joints, Muscle Pain, Muscle weakness, Other      Skin:  DENIES: Jaundice, Easy Bleeding, Lesions/changes in moles, Nail changes,     Skin Discoloration, Rash, Other      Neurological:  Denies: Dizziness, Fainting, Numbness\Tingling, Paralysis,     Seizures, Other      Psychiatric:  Complains of: AAO X 3, Denies: Anxiety, Panic attacks, Depression    , Memory loss, Other      Endocrine:  DiabetesThyroid DisorderOsteoporosisEndocrine Other      Hematologic/lymphatic:  Denies: Bruising, Bleeding, Enlarged Lymph Nodes,     Recurrent infections, Other      Reproductive:  Denies Pregnant, Denies Menopause, Denies Still Menstruating,     Denies Heavy Periods, Denies Other            Vitals            Vital Signs              Date Time  Temp Pulse Resp B/P (MAP) Pulse Ox O2 Delivery O2 Flow Rate FiO2             3/29/18 11:00 96.7 95 16 118/58 99 ROOM AIR              General Appearance:  Alert, Oriented X3, Cooperative, No acute distress      Eyes:  Anicteric Sclerae, Moist Conjunctiva, PERRLA      HEENT:  Orophraynx clear, No Erythema, No Exudates, No Pallor, No Thrush      Neck:  Supple, Full ROM, No Carotid Bruits      Respiratory:  CTAB, No Diminished Breath, No Crackels      Breast\Chest:  Masses, Lumps, Other (left breast mass appears unchanged, skin     involvement noted)      Abdomen\Gastro:  Soft, No Masses       Cardio:  RRR, No Murmur, No, Peripheral Edema, Normal PMI      Skin:  Normal Temperature, Normal Tone, No Rash, lesions, ulcers      Psychiatric:  Appropriate Affect, Intact Judgement, AAO x3      Neuro:  Cranial Nerve II-XII Inta, No Focal Sensory Deficit      Genitourinary:  No Perez Catheter      Muscularskeletal:  Normal Gait and Station, Full ROM of extremeties      Extremities:  No Digital Cyanosis, No Digital Ischemia, Normal Gait and station    , No Weakness      Lymphatic:  No Cervical, No Supraclavicular, No Infraclavicular, No Axillary            Lab Results      Laboratory Tests      3/29/18 10:09            PREVENTION      Hx Influenza Vaccination:  Yes      Date Influenza Vaccine Given:  Nov 1, 2017      2 or More Falls Past Year?:  No      Fall Past Year with Injury?:  No      Hx Pneumococcal Vaccination:  Yes      Encouraged to follow-up with:  PCP regarding preventative exams.      Chart initiated by      DAVID HARRISON CMA                 Disclaimer: Converted document may not contain table formatting or lab diagrams. Please see [employer] [facility] for the authenticated document.

## 2021-05-28 NOTE — PROGRESS NOTES
Patient: AFTAB BALTAZAR     Acct: [id number]     Report: #[id number]  UNIT #: [id number]     : 1960    Encounter Date:2018  PRIMARY CARE: LUCIANA LIANG [facility]  ***Signed***  --------------------------------------------------------------------------------------------------------------------  Chief Complaint      May 9, 2018      BREAST CANCER      Intent of Therapy?:  Curative            Current Plan      -Worsening breast pain      -Patient has worsening size of her breast cancer.  Since this is causing organ     compromise and pain patient will proceed with surgery with Dr. Margarita Lara.      -Patient will return to clinic after surgery and healing for next line of     chemotherapy.      -Discussed with patient we will resume chemotherapy 1 month after surgery.            -Metastatic breast cancer      -Grade 3 invasive ductal carcinoma, ER+ 10%, NH+ 100%, HER2 neg (FISH negative)    . I note that she had suspicion for lymphatic involvement on biopsy.      -Patient received dose dense AC and Weekly Taxol.  Cancer progressed very     rapidly with new lung lesions.        -Orders placed for Lung Biopsy to evaluate [id number] and molecular studies for     Caris.        -Since this an aggressive cancer with possible organ compromise (lung) with     innumberalbe metastatic lesions.  Patient will be treated aggressively with CMF     to control breast cancer.              -Today's Interim Plan      -Return to clinic in 1 month to discuss chemotherapy      -Orders placed for CMF chemotherapy            Medical Evaluation of Chemotherapy Associated Toxicities Today      -Chemotherapy associated fatigue      -Patient's weakness has remained stable.      -Recommended Exercise.       -Continue close observation.      -Chemotherapy associated nausea      -Patient's nausea has remained stable.      -Currently on Anti-Nausea medications.       -Continue close observation.      -Chemotherapy associated  anemia      -Does not require blood transfusion on today's visit.       -Transfuse blood when hemoglobin is less than 7.      -Continue close observation.      -Chemotherapy associated thrombocytopenia      -Does not require platelet transfusion on today's visit.       -Transfuse platelet when platelet count < 20,000.       -Continue close observation.      -Anxiety due to cancer      -Patient's anxiety is well controlled today.       -Continue current management.       -Today's Assessment      -ECOG 1.       -Patient's imaging exams, blood tests, physicians' notes, and any new findings     since our last visit were reviewed today to reassess patient's medical     treatment plan. .       -Old medical records were reviewed and summarized in chronological order in the     HPI today to maintain an updated medical record.       -Patient's radiology imaging tests from our last visit were reviewed     independently by me by direct visualization of the images.        -Patients current lab tests and medications were carefully reviewed to evaluate     patient's current treatment plan today. Proceed with chemotherapy plan.       -Patient was advised to call us right away if there are any new symptoms for an     urgent visit for further evaluation. Patient voiced understanding and agreed to     do so.      Notes      New Medications      * PALBOCICLIB (Ibrance) 125 MG CAPSULE: 125 MG PO QDAY 21 Days #21       Instructions: daily X 21 days, then 7 days off, repeat every 28 days.      * ANASTROZOLE (Arimidex*) 1 MG TABLET: 1 MG PO QDAY #30      New Diagnostics      * CBC, As Soon As Possible       Dx: HER2-negative carcinoma of left breast - C50.912      * Comp Metabolic Panel, As Soon As Possible       Dx: HER2-negative carcinoma of left breast - C50.912      * CA27-29, As Soon As Possible       Dx: HER2-negative carcinoma of left breast - C50.912      New Referrals      * Surgery, As Soon As Possible       Margarita Lara        "REFERRED TO DR. HILL LAYNE        Dx: HER2-negative carcinoma of left breast - C50.912      Intensive Monitor for Toxicity:  Labs Reviewed, Chemo Orders Reviewd, Meds\    Narcotics Reviewed      Labs Reviewed During Visit?:  Yes      Patient Education Provided:  Yes (educated regarding exercise to increase     strength.  can exercise while sitting to prevent further falls.)      Clinical Staging      Stage III A (pulmonary nodules inconclusive)            ECOG      ECOG Performance Status:  0            History and Present Illness      Ms. Juana Stinson is a pleasant 56 year old  female recently diagnosed     with breast cancer. On 11/08/17 she had a screening mammogram which resulted as     abnormal. This lead to an ultrasound guided biopsy. The core needle biopsy on 11 /16/2016 returned as a grade 3 invasive ductal carcinoma, ER+ 10%, WA+ 100%, HER    -2/negin 2+ (reflex FISH negative), with suspicion for lymphatic invasion; no Ki-    67 was available. She states that her left breast mass is extremely painful due     to the mass.             She has a history of CHF and A fib. Echocardiogram in August 2017 showed normal     EF and function.            I have been asked to consult on her breast cancer seeing her for the first time     on 11/30/17.            She was discussed and seen at the breast multidisciplinary clinic on 11/30/17.             Interim History: 1/2/18      Presents for cycle 1 of DDAC + Taxol.  Side effects of chemotherapy discussed.      Has antiemetics and discussed how to use.  Pain in left breast continues.      Taking Morphine.             Interim History: 1/18/18      Presents for cycle 2 of DDAC + Taxol. Nausea controlled with medicine. Left     breast pain has now \"moved\" per the patient is at the inframammary fold. No     fever. No headache. No diarrhea or rash.            Interim History: 2/1/18      Presents for cycle 3 of DDAC.  Was at Dr. Amado's office last week for a " "regular     scheduled visit and experienced flashes of light, dizziness and midsternal     chest pain.  She was sent to the ER for evaluation and labs.  Her ANC at that     time was 70.  She received Neulasta after C2.  CT with PE protocol was obtained     and no pulmonary embolism was noted.  Was noted she had new bilateral new     pulmonary nodules 7mm or less.  Also noted was increase in size of left breast     mass now 8 cm versus 5 cm. (This was noted after reviewing ER note, no call     received from radiology).  Upon review of MRI of breast on 12/15/18 the left     breast mass was 8 cm.  She was discharged home after evaluation.  Today noted     with 13 pound weight loss and weakness.  \"No appetite\".  Will drink 1 maybe 2     Ensures per day and \"I get hungry at 10 pm and will have a snack\".  Has nausea     in the morning.  Not using zofran.  Continues with left breast pain.  No fever,     chills, diarrhea.             Interim History: 2/15/18      Noted with minimal change in breast after 3 cycles of DDAC.  Will change to     Taxol weekly.  Patient in agreement.  Weight has stabilized.  No fever, chills,     nausea, vomiting or increase in pain.              Interim History: 3/7/18      Presents today for #2 weekly Taxol.  Received 3 cycles of DDAC and discontinued     due to intolerance.  Recent admission 2/21/18 due to fall at home.  Discharged 3    /1/18.  Requiring O2 at all times since discharge.  Also using walker with     ambulation due to weakness.  Encouraged to exercise.  Labs reviewed: wbc 8.16,     ANC 6160, Hgb 9.50 and platelets 349,000.  Again discussed with patient and     daughter, lung nodules indicated on CT were indeterminate in nature.        Long discussion with patient and daughter that she was in poor health with many     other comorbidities prior to cancer diagnosis and chemotherapy.  This     complicates the picture.  Denies nausea, vomiting, fever or chills.             Interim " "History: 3/15/18      Presents for #3 Taxol.  Patient reports \"no noticeable difference in left     breast mass\".  Continues to have pain and asking for refill of Morphine Sulfate     ER.  Does not take twice a day or even daily.  Last filled in December 2017.      Tolerating Taxol much better.  Weight is up.  No recent falls.  Does have     nausea every morning.  Does not use antiemetics prior to bedtime.  Encouraged     its use at bedtime as preventive measure.  No fever, chills, vomiting or     increase in pain.              Interim History: 3/22/18      Presents for #4 Taxol.  States this chemotherapy (weekly Taxol) is less     fatiguing.  States \"pain is behind where breast tumor is.  No difference in     breast tumor size noted.\"   Has breast ultrasound later today to evaluate size.      Weight stable, appetite good.  Indicates fatigue improved. Labs reviewed:      wbc 4, hgb 9.1, platelets 205,000 and ANC 3240.  Denies fever, chills, nausea     or vomiting at this time. no worsening neuropathy.              Interim History: 3/29/18      Presents to review ultrasound of left breast cancer.  Patient had not noticed a     decrease in large mass.  Ultrasound showed minimal change in mass despite 3     cycles DDAC and 4 doses of Taxol.  After discussion with Dr. Nieves earlier this     week it was decided to proceed with surgery for removal of mass.  Patient will     meet with Dr. Lara today for surgical evaluation.   Labs reviewed today WBC     3.60, Hemoglobin 8.56, Hematocrit 25.0, Platelet count 333,000, ANC 2400. She     denies any recent fever, chills or s/s of infection.            -April .  PET/CT showed a 8 cm hypermetabolic left breast mass with     hypermetabolic adenopathy in the left axillary, left supraclavicular, proximal     and distal left TATA regions. Numerous bilateral noncalcified pulmonary nodules     measuring up to 8 cm suspicious for metastatic lesions.      -April .  Right " "lung biopsy returned as adenocarcinoma.  Based on     history most likely breast primary.            Vitals      Height 5 ft 5.50 in / 166.37 cm      Weight 266 lbs 8.578 oz / 120.9 kg      BSA 2.43 m2      BMI 43.7 kg/m2      Temperature 97.6 F / 36.44 C - Temporal      Pulse 71      Blood Pressure 105/53 Sitting, Right Arm      Pulse Oximetry 96%, ROOM AIR            Allergies      Coded Allergies:             Cantaloupe (Verified  Allergy, Severe, ANAPHALAXIS, 5/9/18)           SULFA (SULFONAMIDE ANTIBIOTICS) (Verified  Allergy, Severe, \"IT TAKES MY     SKIN OFF\", 5/9/18)           SHELLFISH DERIVED (Verified  Allergy, Unknown, HIVES, 5/9/18)       ONLY CERTAIN SHELLFISH- CAN USE IV DYE/CONTRAST PER PT           GLIPIZIDE (Verified  Adverse Reaction, Mild, NAUSEA, 5/9/18)            Medications      Last Reconciled on 5/11/18 18:38 by ANGELES TORRES MD      Allopurinol (Zyloprim*) 300 Mg Tablet      300 MG PO QDAY for 30 Days, #30 TAB 0 Refills         Reported         5/11/18       Palbociclib (Ibrance) 125 Mg Capsule      125 MG PO QDAY for 21 Days, #21 CAP         Reported         5/11/18       Anastrozole (Arimidex*) 1 Mg Tablet      1 MG PO QDAY, #30 TAB         Reported         5/9/18       Warfarin Sod (Coumadin) 3 Mg Tablet      3.5 MG PO QDAY, #15 TAB 0 Refills         Reported         4/24/18       Liraglutide (Victoza Inj) 0.1 Ml Cartridge      1.8 MG SQ QDAY, EA         Reported         4/24/18       Acetaminophen/Hydrocodone 10/325* (Hydrocodone/Acetaminophen 10/325*) 1 Each     Tablet      1 TAB PO Q4-6H Y for PAIN, TAB         Reported         4/20/18       Furosemide* (Lasix*) 40 Mg Tablet      40 MG PO QDAY for 30 Days, #30 TAB 0 Refills         Prov: Badjaredala,Anesh         3/1/18       Docusate Sodium (Colace) 100 Mg Capsule      100 MG PO PRN, #60 CAP 0 Refills         Reported         1/18/18       Lovastatin (Lovastatin) 40 Mg Tablet      40 MG PO QDAY for 30 Days, #30 TAB         Reported  "        12/14/17       Morphine Sulfate (MS CONTIN) 15 Mg Tablet.er      15 MG PO TID, TAB.ER         Reported         12/14/17       Ondansetron HCl (Zofran) 8 Mg Tablet      8 MG PO Q8H Y for NAUSEA, #30 TAB 3 Refills         Prov: Celestino Nieves         12/5/17       rOPINIRole HCl (Requip) 0.25 Mg Tab      0.25 MG PO HS for 30 Days, #30 TAB         Reported         11/30/17       MDI-Albuterol (Ventolin HFA*) 8 Gm Hfa.aer.ad      2 PUFF INH Q4HR Y for SHORTNESS OF BREATH, #1 MDI 0 Refills         Reported         9/25/17       Meclizine Hcl (Meclizine*) 25 Mg Tablet      25 MG PO Q8 Y for VERTIGO, #30 TAB 1 Refill         Prov: CHRISTAL SPANGLER         8/20/17       sitaGLIPtin (Januvia*) 50 Mg Tablet      50 MG PO C BK, TAB         Reported         11/28/16       lamoTRIgine (lamoTRIgine*) 150 Mg Tablet      150 MG PO BID, #30 TAB 0 Refills         Reported         11/28/16       Ascorbic Acid (Vitamin C*) 500 Mg Tablet      500 MG PO QDAY, #60 TAB 0 Refills         Reported         3/1/16       Cholecalciferol (Vitamin D3*) 1,000 Units Capsule      1000 UNITS PO QDAY, #30 CAP 0 Refills         Reported         3/1/16       Magnesium Oxide (Magnesium Oxide*) 400 Mg Tablet      400 MG PO BID, #60 TAB 1 Refill         Prov: ENRIQUEARDEN         1/30/16       Pentoxifylline (Pentoxifylline) 400 Mg Tabcr      400 MG PO BID, #90         Reported         8/31/15       Gabapentin (Gabapentin) 600 Mg Tablet      600 MG PO BID, #60         Reported         8/31/15       Aspirin EC (Aspirin EC*) 81 Mg Tabec      81 MG PO HS, #90         Reported         8/31/15       Metformin Hcl (Metformin Hcl*) 1,000 Mg Tablet      1000 MG PO BID, #180         Reported         8/31/15       Isosorbide Mononitrate ER (Isosorbide Mononitrate ER) 60 Mg Tab.er.24h      60 MG PO QAM, #30         Reported         8/31/15       Spironolactone (Spironolactone*) 25 Mg Tablet      25 MG PO QDAY, #30         Reported         8/31/15       Metoprolol  Tartrate (Metoprolol Tartrate*) 100 Mg Tablet      100 MG PO BID, #60         Reported         8/31/15            General Information      Referring Provider:  VANESSA LONGORIA [facility]      Primary Provider:  LUCIANA LIANG [facility] Provider 2:  Deena Amado            Pain and Fatigue Scales      Pain Assessment:           Experiencing any pain?:  Yes         Pain Scale Used:  Numerical         Pain Intensity:  8         Pain Location:  Breast (LEFT), Chest, Wrist         Description:  Aching         Duration:  Continuous            Chemo Status      On Oral Chemotherapy?:  No            Other      Clinical Trial Participant?:  No            PAST, FAMILY   Past Medical History      Past Medical History:  No Diabetes Type 1, Yes Diabetes Type 2, No Thyroid     Disease, Yes COPD, No Emphysema, No Hypertension, No Stroke, No High Cholesterol    , No Heart Attack, No Bleeding Condition, No Low or High RBC Count, No Low or     High WBC Count, No Low or High Platelet Coun, No Hepatitis, No Kidney Disease,     No Depression, No Alzheimer's Disease, No Mental Disease, No Seizures, No     Arthritis, No Osteoporosis, No Osteopenia, No Short of Air, No Sleep apnea, No     Liver Disease, No STD, No Enlarged Prostate, No Other      Hematology/oncology:  REPORTS HX OF: Other cancer history (gull bladder),     DENIES HX OF: Previous Treatment for CA, Anemia, Bladder Cancer, Blood cancer,     Brain cancer, Breast cancer, Cervical cancer, Coagulopathy, Colorectal cancer,     Endocrine cancer, Eye cancer, GI cancer,  cancer, Kidney cancer, Leukemia,     Leukocytosis, Leukopenia, Liver cancer, Lung cancer, Lymphoma, Musculoskeletal     cancer, Myeloma, Neurologic cancer, Oral cancer, Ovarian cancer, Skin cancer,     Stomach cancer, Thrombocytopenia, Thyroid cancer, Uterine cancer, Other     hematologic history      Genetic/metabolic:  DENIES HX OF: Cystic fibrosis, Down syndrome, Other genetic     history, Other  metabolic history            Past Surgical History      REPORTS HX OF: Thyroid surgery, Lung biopsy, Appendectomy, Lumpectomy (left),     Biopsy, Other Past Surgical Hx (right knee surgery), DENIES HX OF: Cataract     extraction, CABG surgery, Coronary stent, Valve replacement, Cholecystectomy,     Splenectomy, Bladder surgery, Nephrectomy, Joint replacement, Frature repair,     Skin cancer removal, Melanoma excision, Spinal surgery, Breast biopsy,     Mastectomy, bilateral, Mastectomy, right, Mastectomy, left, Hysterectomy, Peg     Tube Placement, VAD Placement            Family History      REPORTS HX OF: Colorectal cancer (uncle), Kidney Cancer (uncle), Lung cancer (    uncle/aunt), DENIES HX OF: Anemia, Blood disorders, Blood Cancer, Breast cancer    , Cervical cancer, Coagulopathy, Endocrine Cancer, Eye Cancer, GI Cancer,      Cancer, Leukemia, Leukocytosis, Leukopenia, Liver Cancer, Lymphoma, Melanoma,     Musculoskeletal Cancer, Myeloma, Neurologic Cancer, Oral Cancer, Ovarian cancer    , Prostate cancer, Skin Cancer, Stomach Cancer, Testicular Cancer,     Thrombocytopenia, Thyroid cancer, Uterine cancer, Other Cancer History, Other     Hematology History            Social History      Lives independently:  Yes            Tobacco Use      Tobacco status:  Former smoker      Smoking history:              Alcohol Use      Alcohol intake:  rarely      Counseling given:  None            Substance Use      Substance use:  Denies use            REVIEW OF SYSTEMS      General:  Complains of: Appetite change, Fatigue, Weight loss, Denies:     Excessive sweating, Fever, Night sweats, Weight gain, Other      Eyes:  Denies: Blurred vision, Corrective lenses, Diplopia, Eye irritation, Eye     pain, Eye redness, Spots in vision, Vision loss, Other      Ears, nose, mouth, throat:  Complains of: Headache, Denies: Seizures, Visual     Changes, Hearing loss, Sinus Congestion, Hoarseness, Sore throat, Other       Cardiovascular:  Denies: Chest pain, Irregular heartbeat, Palpitations, Swollen     ankles/legs, Other      Respiratory:  Denies: Chest pain, Shortness of Air, Productive cough, Coughing     blood, Other      Gastrointestinal:  Complains of: Nausea, Denies: Vomiting, Problem swallowing,     Frequent heartburn, Constipation, Diarrhea, Tarry stools, Bloody stools, Unable     to control bowels, Other      Kidney/Bladder:  Denies: Painful Urination, Change in urinary stream, Blood in     urine, Incontinence, Frequent Urination, Decreased urine stream, Other      Musculoskeletal:  Denies: New Back pain, Leg Cramps, Painful Joints, Swollen     Joints, Muscle Pain, Muscle weakness, Other      Skin:  DENIES: Jaundice, Easy Bleeding, Lesions/changes in moles, Nail changes,     Skin Discoloration, Rash, Other      Neurological:  Denies: Dizziness, Fainting, Numbness\Tingling, Paralysis,     Seizures, Other      Psychiatric:  Complains of: AAO X 3, Denies: Anxiety, Panic attacks, Depression    , Memory loss, Other      Endocrine:  DiabetesThyroid DisorderOsteoporosisEndocrine Other      Hematologic/lymphatic:  Denies: Bruising, Bleeding, Enlarged Lymph Nodes,     Recurrent infections, Other      Reproductive:  Denies Pregnant, Denies Menopause, Denies Still Menstruating,     Denies Heavy Periods, Denies Other            Vitals            Vital Signs              Date Time  Temp Pulse Resp B/P (MAP) Pulse Ox O2 Delivery O2 Flow Rate FiO2             4/20/18 11:15 99.1 69  104/54 99 ROOM AIR               3/29/18 11:00   16                 General Appearance:  Alert, Oriented X3, Cooperative, No acute distress      Eyes:  Anicteric Sclerae, Moist Conjunctiva, PERRLA      HEENT:  Orophraynx clear, No Erythema, No Exudates, No Pallor, No Thrush      Neck:  Supple, Full ROM, No Carotid Bruits      Respiratory:  CTAB, No Diminished Breath, No Crackels      Breast\Chest:  Masses, Lumps, Other (left breast mass appears unchanged,  skin     involvement noted)      Abdomen\Gastro:  Soft, No Masses      Cardio:  RRR, No Murmur, No, Peripheral Edema, Normal PMI      Skin:  Normal Temperature, Normal Tone, No Rash, lesions, ulcers      Psychiatric:  Appropriate Affect, Intact Judgement, AAO x3      Neuro:  Cranial Nerve II-XII Inta, No Focal Sensory Deficit      Genitourinary:  No Perez Catheter      Muscularskeletal:  Normal Gait and Station, Full ROM of extremeties      Extremities:  No Digital Cyanosis, No Digital Ischemia, Normal Gait and station    , No Weakness      Lymphatic:  No Cervical, No Supraclavicular, No Infraclavicular, No Axillary            Lab Results      Laboratory Tests      4/20/18 12:13            PREVENTION      Hx Influenza Vaccination:  Yes      Date Influenza Vaccine Given:  Nov 1, 2017      2 or More Falls Past Year?:  No      Fall Past Year with Injury?:  No      Hx Pneumococcal Vaccination:  Yes      Encouraged to follow-up with:  PCP regarding preventative exams.      Chart initiated by      DAVID HARRISON CMA                 Disclaimer: Converted document may not contain table formatting or lab diagrams. Please see [facility] for the authenticated document.

## 2021-05-28 NOTE — H&P
"Patient: AFTAB BALATZAR     Acct: [id number]     Report: #[id number]  UNIT #: [id number]     : 1960    Encounter Date:02/15/2018  PRIMARY CARE: LUCIANA LIANG [facility]  ***Signed***  --------------------------------------------------------------------------------------------------------------------  Chief Complaint      Feb 15, 2018      LEFT BREAST CANCER HORMONE RECEPTOR POSITIVE, HER 2 LEIF NEGATIVE.  (CURATIVE,     PLEASE NOTE PULMONARY NODULES INCONCLUSIVE)            Vitals      Height 5 ft 5.5 in / 166.37 cm      Weight 283 lbs 8.185 oz / 128.6 kg      BSA 2.51 m2      BMI 46.5 kg/m2      Temperature 98.3 F / 36.83 C - Temporal      Pulse 120      Blood Pressure 122/54 Sitting      Pulse Oximetry 97%, ROOM AIR            General Information      Referring Provider:  VANESSA LONGORIA [facility]      Primary Provider:  LUCIANA LIANG [facility]            Allergies      Coded Allergies:             Cantaloupe (Verified  Allergy, Unknown, ANAPHALAXIS, 18)           SHELLFISH DERIVED (Verified  Allergy, Unknown, HIVES, 18)       ONLY CERTAIN SHELLFISH- CAN USE IV DYE/CONTRAST PER PT           SULFA (SULFONAMIDE ANTIBIOTICS) (Verified  Allergy, Unknown, \"IT TAKES MY     SKIN OFF\", 18)           GLIPIZIDE (Verified  Adverse Reaction, Mild, NAUSEA, 18)            Medications      Last Reconciled on 2/15/18 13:21 by ADRIANNA TSAI      OLANZapine (ZyPREXA*) 10 Mg Tablet      10 MG PO HS for 30 Days, #30 TAB         Reported         18       Docusate Sodium (Colace) 100 Mg Capsule      100 MG PO BID Y for CONSTIPATION, #60 CAP 0 Refills         Reported         18       Dexamethasone (Decadron) 4 Mg Tablet      8 MG PO ASDIR, TAB         Reported         18       Calcium Carbonate (Calcium Antacid) 1,000 Mg Tab.chew      1000 MG PO QDAY Y for HEARTBURN, TAB.CHEW         Reported         18       Prochlorperazine Maleate (Prochlorperazine Maleate) 10 Mg Tab      10 " MG PO Q6H Y for NAUSEA AND/OR VOMITING, #60 TAB 3 Refills         Prov: Celestino Nieves         12/19/17       Warfarin Sod (Coumadin*) 2 Mg Tablet      2 MG PO QDAY, #30 TAB 0 Refills         Reported         12/14/17       Pantoprazole (Protonix*) 40 Mg Tablet.dr      40 MG PO QDAY@07, #30 TAB 0 Refills         Reported         12/14/17       Lovastatin (Lovastatin) 40 Mg Tablet      40 MG PO QDAY for 30 Days, #30 TAB         Reported         12/14/17       Morphine Sulfate (MS CONTIN) 15 Mg Tablet.er      15 MG PO Q12HR, TAB.ER         Reported         12/14/17       Ondansetron HCl (Zofran) 8 Mg Tablet      8 MG PO Q8H Y for NAUSEA, #30 TAB 3 Refills         Prov: Celestino Nieves         12/5/17       rOPINIRole HCl (Requip) 0.25 Mg Tab      0.25 MG PO HS for 30 Days, #30 TAB         Reported         11/30/17       Hydrocodone/Acetaminophen 7.5/325 MG (Norco 7.5/325 Mg) 1 Tab Tab      1 TAB PO Q4-6H Y for BREAKTHROUGH PAIN, TAB 0 Refills         Reported         11/30/17       Furosemide* (Lasix*) 40 Mg Tablet      60 MG PO QDAY, #30 TAB 0 Refills         Reported         11/30/17       MDI-Albuterol (Ventolin HFA*) Unknown Strength Hfa.aer.ad      8 GR INH Q4-6H Y for SHORTNESS OF BREATH, #1 MDI 0 Refills         Reported         9/25/17       Meclizine Hcl (Meclizine*) 25 Mg Tablet      25 MG PO Q8 Y for VERTIGO, #30 TAB 1 Refill         Prov: CHRISTAL SPANGLER         8/20/17       Liraglutide (Victoza 3-Bereket) 0.6 Mg/0.1 Ml Pen.injctr      1.8 MG SUBQ QDAY, PACKAGE         Reported         5/30/17       sitaGLIPtin (Januvia*) 50 Mg Tablet      50 MG PO C BK, TAB         Reported         11/28/16       lamoTRIgine (lamoTRIgine*) 150 Mg Tablet      150 MG PO BID, #30 TAB 0 Refills         Reported         11/28/16       Febuxostat (Uloric*) 40 Mg Tab      40 MG PO QDAY, TAB         Reported         10/13/16       Ascorbic Acid (Vitamin C*) 500 Mg Tablet      500 MG PO QDAY, #60 TAB 0 Refills         Reported         3/1/16        Cholecalciferol (Vitamin D3*) 1,000 Units Capsule      1000 UNITS PO QDAY, #30 CAP 0 Refills         Reported         3/1/16       Magnesium Oxide (Magnesium Oxide*) 400 Mg Tablet      400 MG PO BID, #60 TAB 1 Refill         Prov: ARDEN NUNEZ         1/30/16       Pentoxifylline (Pentoxifylline) 400 Mg Tabcr      400 MG PO TID, #90         Reported         8/31/15       Gabapentin (Gabapentin) 600 Mg Tablet      600 MG PO BID, #60         Reported         8/31/15       Aspirin EC (Aspirin EC*) 81 Mg Tabec      81 MG PO HS, #90         Reported         8/31/15       Metformin Hcl (Metformin Hcl*) 1,000 Mg Tablet      1000 MG PO BID, #180         Reported         8/31/15       Isosorbide Mononitrate ER (Isosorbide Mononitrate ER) 60 Mg Tab.er.24h      60 MG PO QAM, #30         Reported         8/31/15       Spironolactone (Spironolactone*) 25 Mg Tablet      25 MG PO QDAY, #30         Reported         8/31/15       Metoprolol Tartrate (Metoprolol Tartrate*) 100 Mg Tablet      100 MG PO BID, #60         Reported         8/31/15      Current Medications      Current Medications Reviewed 2/15/18            Pain and Fatigue Scales      Pain Assessment:           Experiencing any pain?:  Yes         Pain Scale Used:  Numerical         Pain Location:  Breast         Description:  Aching         Duration:  Continuous      Fatigue:           Experiencing any fatigue?:  Yes         Fatigue (0-10 scale):  10            Chemo Status      On Oral Chemotherapy?:  No            Other      Clinical Trial Participant?:  No            Past Medical History      Yes Diabetes Type 2, Yes COPD      Hematology/oncology:  REPORTS HX OF: Other cancer history (gull bladder)            Past Surgical History      REPORTS HX OF: Thyroid surgery, Appendectomy, Lumpectomy (left), Other Past     Surgical Hx (right knee surgery)            Family History      REPORTS HX OF: Colorectal cancer (uncle), Kidney Cancer (uncle), Lung cancer (   "  uncle/aunt)            Social History      Yes            Tobacco Use      Tobacco status:  Former smoker      Smoking history:              Alcohol Use      Alcohol intake:  rarely      Counseling given:  None            Substance Use      Substance use:  Denies use            Past Oncology Illness History      Chief complaint: Breast cancer             Ms. Juana Stinson is a pleasant 56 year old  female recently diagnosed     with breast cancer. On 11/08/17 she had a screening mammogram which resulted as     abnormal. This lead to an ultrasound guided biopsy. The core needle biopsy on 11 /16/2016 returned as a grade 3 invasive ductal carcinoma, ER+ 10%, OK+ 100%, HER    -2/negin 2+ (reflex FISH negative), with suspicion for lymphatic invasion; no Ki-    67 was available. She states that her left breast mass is extremely painful due     to the mass.             She has a history of CHF and A fib. Echocardiogram in August 2017 showed normal     EF and function.            I have been asked to consult on her breast cancer seeing her for the first time     on 11/30/17.            She was discussed and seen at the breast multidisciplinary clinic on 11/30/17.             Interim History: 1/2/18      Presents for cycle 1 of DDAC + Taxol.  Side effects of chemotherapy discussed.      Has antiemetics and discussed how to use.  Pain in left breast continues.      Taking Morphine.             Interim History: 1/18/18      Presents for cycle 2 of DDAC + Taxol. Nausea controlled with medicine. Left     breast pain has now \"moved\" per the patient is at the inframammary fold. No     fever. No headache. No diarrhea or rash.            Interim History: 2/1/18      Presents for cycle 3 of DDAC.  Was at Dr. Amado's office last week for a regular     scheduled visit and experienced flashes of light, dizziness and midsternal     chest pain.  She was sent to the ER for evaluation and labs.  Her ANC at that     time was 70.  She " "received Neulasta after C2.  CT with PE protocol was obtained     and no pulmonary embolism was noted.  Was noted she had new bilateral new     pulmonary nodules 7mm or less.  Also noted was increase in size of left breast     mass now 8 cm versus 5 cm. (This was noted after reviewing ER note, no call     received from radiology).  Upon review of MRI of breast on 12/15/18 the left     breast mass was 8 cm.  She was discharged home after evaluation.  Today noted     with 13 pound weight loss and weakness.  \"No appetite\".  Will drink 1 maybe 2     Ensures per day and \"I get hungry at 10 pm and will have a snack\".  Has nausea     in the morning.  Not using zofran.  Continues with left breast pain.  No fever,     chills, diarrhea.             Interim History: 2/15/18      Noted with minimal change in breast after 3 cycles of DDAC.  Will change to     Taxol weekly.  Patient in agreement.  Weight has stabilized.  No fever, chills,     nausea, vomiting or increase in pain.            ROS      General:  Complains of: Fatigue, Denies: Appetite change, Weight loss      Eyes:  Denies: Blurred vision, Corrective lenses      Ears, nose, mouth, throat:  Denies: Headache, Seizures, Visual Changes      Cardiovascular:  Denies: Chest pain, Irregular heartbeat, Palpitations      Respiratory:  Denies: Shortness of Air, Productive cough, Coughing blood      Gastrointestinal:  Denies: Nausea, Vomiting, Constipation, Diarrhea      Kidney/Bladder:  Denies: Painful Urination, Blood in urine      Musculoskeletal:  Denies: New Back pain, Muscle weakness      Skin:  DENIES: Easy Bleeding, Nail changes, Rash      Neurological:  Denies: Dizziness, Numbness\Tingling      Psychiatric:  Complains of: AAO X 3, Denies: Anxiety, Panic attacks      Endocrine:  Denies DiabetesDenies Thyroid Disorder      Hematologic/lymphatic:  Denies: Bruising, Bleeding, Enlarged Lymph Nodes      Reproductive:  Denies Pregnant, Denies Menopause            Vitals       "      Vital Signs              Date Time  Temp Pulse Resp B/P (MAP) Pulse Ox O2 Delivery O2 Flow Rate FiO2             2/1/18 09:44 98.5 76 16 114/38 98 ROOM AIR              General Appearance:  Alert, Oriented X3, Cooperative, No acute distress      Eyes:  Anicteric Sclerae, Moist Conjunctiva, PERRLA      HEENT:  Orophraynx clear, No Erythema, No Exudates      Neck:  Supple, Full ROM, No Carotid Bruits      Respiratory:  CTAB, No Crackels      Abdomen\Gastro:  Soft, No Masses      Cardio:  RRR, No Murmur, No, Peripheral Edema, Normal PMI      Skin:  Normal Temperature, Normal Tone, No Rash, lesions, ulcers      Psychiatric:  Appropriate Affect, Intact Judgement, AAO x3      Neuro:  Cranial Nerve II-XII Inta, No Focal Sensory Deficit      Muscularskeletal:  Normal Gait and Station, Full ROM of extremeties      Extremities:  No Digital Cyanosis, No Digital Ischemia, Normal Gait and station    , Weakness      Lymphatic:  No Cervical, No Supraclavicular, No Infraclavicular, No Axillary            Lab Results      Laboratory Tests      2/15/18 10:20            Laboratory Tests            Test        1/25/18      18:10             Magnesium Level        2.01 mg/dL      (1.60-2.30)            Current Plan      It was a pleasure meeting Ms. Stinson and I appreciate the opportunity to     participate in her care. I reviewed and summarized her pathology and outside     records as noted. I reviewed the results of her breast imaging studies as well     as her pathology from the core needle biopsy. This biopsy was positive for a     grade 3 invasive ductal carcinoma, ER+ 10%, AZ+ 100%, HER2 neg (FISH negative).     I note that she had suspicion for lymphatic involvement on biopsy.            I reviewed with her the most recent NCCN Invasive Breast Cancer Version 3.2017     Guidelines. Obviously in this setting this is concerning for possible     metastatic disease due to the size of the mass and the presence of possible      lymphatic invasion. Therefore, I have recommended that we perform a chest CT     for additional evaluation as she has had previous imaging. Additional biopsies     may be indicated to further stage her cancer based on these imaging studies. I     counseled her as noted below with the assumption that she did not have widely     metastatic disease based on the result of the upcoming imaging.            Given the grade and the size of her mass consideration can be made for     neoadjuvant chemotherapy. I counseled her on the numerous benefits to receiving     chemotherapy in the neoadjuvant setting per the NCCN recommendations including     facilitation of breast conservation if indicated, rendering inoperable tumors     operable, providing prognostic information added individual patient level based     on response to therapy, allowing time for genetic testing, may allow sentinel     lymph node biopsy alone if the axilla is clear to therapy, may provide an     opportunity to modify systemic treatment if no preoperative therapy response or     progression of disease is noted, and may allow for smaller radiotherapy ports     or less radiotherapy if axillary abiola disease is cleared. She voices     understanding of these benefits and was in agreement.            I then counseled her dose dense Adriamycin and Cytoxan followed by Taxol once I     reviewed her last echocardiogram with an ejection fraction of 65%  I reviewed     with her the potential risks and complications of this regimen including the     increased risk of death while receiving treatment and she voiced understanding     of these risk and was in agreement. In preparation for chemotherapy she will be     referred for a Port-A-Cath and echocardiogram as we will be utilizing a     cardiotoxic drug.               I counseled her that at the conclusion of chemotherapy and resection she would     likely require radiation assuming a lumpectomy is performed. She  would     definitely benefit from endocrine therapy at the conclusion of these treatments     based on the ER+/DC+ disease.            We will order a pre-treatment breast MRI. We would repeat the breast MRI post     treatment prior to consideration of resection.            Her morbid obesity complicates all aspects of care.            Current Plan: 1/2/18      Proceed with C1 of DDAC + Taxol.  MRI results reviewed with patient large 8 cm     mass in lower inner quadrant of left breast with 2.5 cm linear and ductal non-    mass enhancement extends from the mass to left nipple and enlarged left     axillary lymph node.  Again plan to MRI after treatment.  Side effects     discussed.  Labs reviewed.  Will start with toxicity monitoring.  Will follow     up in 2 weeks.             Current Plan: 1/18/18      Proceed with C2 of DDAC + Taxol. Toxicity monitoring. MRI results again     reviewed with patient large 8 cm mass in lower inner quadrant of left breast     with 2.5 cm linear and ductal non-mass enhancement extends from the mass to     left nipple and enlarged left axillary lymph node. I counseled her that     clinically this appeared consistent with Stage IIIA (T3 N1) disease. Will     repeat MRI after treatment. Labs reviewed. Toxicity monitoring. Will perform     breast exam at next appointment.            Current Plan: 2/1/18      Review of ER medical records and imaging performed.  Patient was most likely     dehydrated, anemic and with significant amount of weight loss from the therapy.      This is what probably lead to the ER visit and her symptoms.  She has a     history of CHF and Atrial Fibrillation.  Discussed her weight loss and dietary     interventions to stave off further weight loss and dehydration.  She has     multiple comorbidities and multiple medications that may need to be adjusted     during her course of chemotherapy.  Recommended regular more frequent check ups     with cardiologist and  PCP to co-manage her medications and comorbidities.     Recommended Compazine at bedtime and to take ODT Zofran prior to getting out of     bed to decrease nausea.              Current Plan: 2/15/18      Switch to taxane based therapy.  Week 1 today. Discussed rationale for change.      Patient in agreement.  Side effects discussed.  Labs reviewed and WBC 9.7     Hemoglobin 7.5 and Platelet 250,000.  Her anemia is most likely contributing to     her fatigue, dizziness and lightheadedness.  PRBCs ordered 2 units.  Plan to     give Lasix 40 mg IV between the two doses.  She will follow up in one week.              She was given time to ask questions and these questions were answered in turn.             She had no additional questions or concerns and all questions were answered to     her satisfaction.            Available for immediate release. Please forward a copy of this dictation to Dr. Margarita Lara .             ER+ ME+ carcinoma of breast       Carcinoma of right breast, estrogen and progesterone receptor positive -     C50.911, Z17.0       Laterality: right            HER2-negative carcinoma of left breast - C50.912            Anemia associated with chemotherapy - D64.81, T45.1X5A            Cancer related pain - G89.3      Follow-up:  2 Weeks      Next Visit Labs:  CBC, CMP      Intensive Monitor for Toxicity:  Labs Reviewed, Chemo Orders Reviewd, Meds\    Narcotics Reviewed      Labs Reviewed During Visit?:  Yes      Review/Other:  Received Lab Test, Ordered Lab Test, Reviewed Medicine Test,     Other (ordered prbcs, changing chemotherapy regimens. )      ECOG Score:  0      Clinical Staging      Stage III A (pulmonary nodules inconclusive)      Attending      See the above note for details. I saw and evaluated the patient and agree with     the NP's findings and plan as documented. I reviewed the review of systems and     past histories. I reviewed the patient's case and plan with the NP as noted.      She has not had a significant response thus far to dose dense AC chemotherapy     and I have elected to transition her to weekly Taxol and she will receive week #    1 today. This was discussed with her at bedside. Again reviewed the rationale     this decision and she was in agreement. We will proceed with treatment.            Hx Influenza Vaccination:  Yes (2017)      Date Influenza Vaccine Given:  Nov 1, 2017      Hx Pneumococcal Vaccination:  Yes (UTD)                 Disclaimer: Converted document may not contain table formatting or lab diagrams. Please see [facility] for the authenticated document.

## 2021-05-28 NOTE — PROGRESS NOTES
Patient: AFTAB BALTAZAR     Acct: [id number]     Report: #[id number]  UNIT #: [id number]     : 1960    Encounter Date:2018  PRIMARY CARE: LUCIANA LIANG [facility]  ***Signed***  --------------------------------------------------------------------------------------------------------------------  Chief Complaint      May 31, 2018      BREAST CANCER      Intent of Therapy?:  Curative            Current Plan      -Inflammatory breast cancer      -Originally diagnosed as Grade 3 invasive ductal carcinoma, ER+ 10%, IA+ 100%,     HER2 neg (FISH negative). I note that she had suspicion for lymphatic     involvement on biopsy.      -Patient received dose dense AC and Weekly Taxol.  Cancer progressed very     rapidly with new lung lesions.        -Orders placed for Lung Biopsy to evaluate [id number] and molecular studies for     Caris.        -Since this an aggressive cancer with possible organ compromise (lung) with     innumberalbe metastatic lesions.  Patient will be treated aggressively with CMF     to control breast cancer.        -Discussed with patient that she cannot receive chemotherapy until she heals     from surgery because her surgery will will never heal      -Once patient returns patient will receive chemotherapy            -Worsening breast pain      -Patient has worsening size of her breast cancer.  Since this is causing organ     compromise and pain patient will proceed with surgery with Dr. Margarita Lara.      -Patient will return to clinic after surgery and healing for next line of     chemotherapy.      -Patient had mastectomy due to worsening pain and impending organ compromise.      -Pathological results came back as ER negative IA negative Ki-67 80%.  HER-2/    negin 1+            -Today's Orders and Plan      -Orders placed for chemotherapy.      -Renewed lomotrigine and meclizine      -Review pathology results.            Medical Evaluation of Chemotherapy Associated Toxicities  Today      -Chemotherapy associated fatigue      -Patient's weakness has remained stable.      -Recommended Exercise.       -Continue close observation.      -Chemotherapy associated nausea      -Patient's nausea has remained stable.      -Currently on Anti-Nausea medications.       -Continue close observation.      -Chemotherapy associated anemia      -Does not require blood transfusion on today's visit.       -Transfuse blood when hemoglobin is less than 7.      -Continue close observation.      -Chemotherapy associated thrombocytopenia      -Does not require platelet transfusion on today's visit.       -Transfuse platelet when platelet count < 20,000.       -Continue close observation.      -Anxiety due to cancer      -Patient's anxiety is well controlled today.       -Continue current management.       -Today's Assessment      -ECOG 1.       -Patient's imaging exams, blood tests, physicians' notes, and any new findings     since our last visit were reviewed today to reassess patient's medical     treatment plan. .       -Old medical records were reviewed and summarized in chronological order in the     HPI today to maintain an updated medical record.       -Patient's radiology imaging tests from our last visit were reviewed     independently by me by direct visualization of the images.        -Patients current lab tests and medications were carefully reviewed to evaluate     patient's current treatment plan today. Proceed with chemotherapy plan.       -Patient was advised to call us right away if there are any new symptoms for an     urgent visit for further evaluation. Patient voiced understanding and agreed to     do so.      Breast cancer - C50.919            Notes      Renewed Medications      * lamoTRIgine (lamoTRIgine*) 150 MG TABLET: 150 MG PO BID #30       Instructions: 1 TAB      * Meclizine Hcl (Meclizine*) 25 MG TABLET: 25 MG PO Q8 PRN VERTIGO #30      Intensive Monitor for Toxicity:  Labs Reviewed,  "Chemo Orders Reviewd, Meds\    Narcotics Reviewed      Labs Reviewed During Visit?:  Yes      Patient Education Provided:  Yes (educated regarding exercise to increase     strength.  can exercise while sitting to prevent further falls.)      Clinical Staging      Stage III A (pulmonary nodules inconclusive)            ECOG      ECOG Performance Status:  0            History and Present Illness      Ms. Juana Stinson is a pleasant 56 year old  female recently diagnosed     with breast cancer. On 11/08/17 she had a screening mammogram which resulted as     abnormal. This lead to an ultrasound guided biopsy. The core needle biopsy on 11 /16/2016 returned as a grade 3 invasive ductal carcinoma, ER+ 10%, CA+ 100%, HER    -2/negin 2+ (reflex FISH negative), with suspicion for lymphatic invasion; no Ki-    67 was available. She states that her left breast mass is extremely painful due     to the mass.             She has a history of CHF and A fib. Echocardiogram in August 2017 showed normal     EF and function.            I have been asked to consult on her breast cancer seeing her for the first time     on 11/30/17.            She was discussed and seen at the breast multidisciplinary clinic on 11/30/17.             Interim History: 1/2/18      Presents for cycle 1 of DDAC + Taxol.  Side effects of chemotherapy discussed.      Has antiemetics and discussed how to use.  Pain in left breast continues.      Taking Morphine.             Interim History: 1/18/18      Presents for cycle 2 of DDAC + Taxol. Nausea controlled with medicine. Left     breast pain has now \"moved\" per the patient is at the inframammary fold. No     fever. No headache. No diarrhea or rash.            Interim History: 2/1/18      Presents for cycle 3 of DDAC.  Was at Dr. Amado's office last week for a regular     scheduled visit and experienced flashes of light, dizziness and midsternal     chest pain.  She was sent to the ER for evaluation and " "labs.  Her ANC at that     time was 70.  She received Neulasta after C2.  CT with PE protocol was obtained     and no pulmonary embolism was noted.  Was noted she had new bilateral new     pulmonary nodules 7mm or less.  Also noted was increase in size of left breast     mass now 8 cm versus 5 cm. (This was noted after reviewing ER note, no call     received from radiology).  Upon review of MRI of breast on 12/15/18 the left     breast mass was 8 cm.  She was discharged home after evaluation.  Today noted     with 13 pound weight loss and weakness.  \"No appetite\".  Will drink 1 maybe 2     Ensures per day and \"I get hungry at 10 pm and will have a snack\".  Has nausea     in the morning.  Not using zofran.  Continues with left breast pain.  No fever,     chills, diarrhea.             Interim History: 2/15/18      Noted with minimal change in breast after 3 cycles of DDAC.  Will change to     Taxol weekly.  Patient in agreement.  Weight has stabilized.  No fever, chills,     nausea, vomiting or increase in pain.              Interim History: 3/7/18      Presents today for #2 weekly Taxol.  Received 3 cycles of DDAC and discontinued     due to intolerance.  Recent admission 2/21/18 due to fall at home.  Discharged 3    /1/18.  Requiring O2 at all times since discharge.  Also using walker with     ambulation due to weakness.  Encouraged to exercise.  Labs reviewed: wbc 8.16,     ANC 6160, Hgb 9.50 and platelets 349,000.  Again discussed with patient and     daughter, lung nodules indicated on CT were indeterminate in nature.        Long discussion with patient and daughter that she was in poor health with many     other comorbidities prior to cancer diagnosis and chemotherapy.  This     complicates the picture.  Denies nausea, vomiting, fever or chills.             Interim History: 3/15/18      Presents for #3 Taxol.  Patient reports \"no noticeable difference in left     breast mass\".  Continues to have pain and asking " "for refill of Morphine Sulfate     ER.  Does not take twice a day or even daily.  Last filled in December 2017.      Tolerating Taxol much better.  Weight is up.  No recent falls.  Does have     nausea every morning.  Does not use antiemetics prior to bedtime.  Encouraged     its use at bedtime as preventive measure.  No fever, chills, vomiting or     increase in pain.              Interim History: 3/22/18      Presents for #4 Taxol.  States this chemotherapy (weekly Taxol) is less     fatiguing.  States \"pain is behind where breast tumor is.  No difference in     breast tumor size noted.\"   Has breast ultrasound later today to evaluate size.      Weight stable, appetite good.  Indicates fatigue improved. Labs reviewed:      wbc 4, hgb 9.1, platelets 205,000 and ANC 3240.  Denies fever, chills, nausea     or vomiting at this time. no worsening neuropathy.              Interim History: 3/29/18      Presents to review ultrasound of left breast cancer.  Patient had not noticed a     decrease in large mass.  Ultrasound showed minimal change in mass despite 3     cycles DDAC and 4 doses of Taxol.  After discussion with Dr. Nieves earlier this     week it was decided to proceed with surgery for removal of mass.  Patient will     meet with Dr. Lara today for surgical evaluation.   Labs reviewed today WBC     3.60, Hemoglobin 8.56, Hematocrit 25.0, Platelet count 333,000, ANC 2400. She     denies any recent fever, chills or s/s of infection.            -April .  PET/CT showed a 8 cm hypermetabolic left breast mass with     hypermetabolic adenopathy in the left axillary, left supraclavicular, proximal     and distal left TATA regions. Numerous bilateral noncalcified pulmonary nodules     measuring up to 8 cm suspicious for metastatic lesions.      -April .  Right lung biopsy returned as adenocarcinoma.  Based on     history most likely breast primary.       -May .  Inflammatory carcinoma.  T4 NX M1.  ER " "negative.  TN negative Ki-    67 80%.            Vitals      Height 5 ft 5.5 in / 166.37 cm      Weight 248 lbs 3.807 oz / 112.6 kg      BSA 2.34 m2      BMI 40.7 kg/m2      Temperature 97.6 F / 36.44 C - Temporal      Pulse 82      Blood Pressure 114/50 Sitting, Right Arm      Pulse Oximetry 95%, room air            Allergies      Coded Allergies:             Cantaloupe (Verified  Allergy, Severe, ANAPHALAXIS, 5/11/18)           SULFA (SULFONAMIDE ANTIBIOTICS) (Verified  Allergy, Severe, \"IT TAKES MY     SKIN OFF\", 5/11/18)           SHELLFISH DERIVED (Verified  Allergy, Unknown, HIVES, 5/11/18)       ONLY CERTAIN SHELLFISH- CAN USE IV DYE/CONTRAST PER PT           GLIPIZIDE (Verified  Adverse Reaction, Mild, NAUSEA, 5/11/18)            Medications      Last Reconciled on 6/13/18 20:19 by ANGELES LONDONO MD      Febuxostat (Uloric*) 40 Mg Tab      40 MG PO QDAY, TAB         Reported         6/11/18       Pantoprazole (Protonix*) 40 Mg Tablet.dr      40 MG PO QDAY@07, #30 TAB 0 Refills         Reported         6/11/18       Warfarin Sod (Coumadin*) 2 Mg Tablet      2 MG PO QDAY, #30 TAB 0 Refills         Reported         6/11/18       Furosemide* (Lasix*) 40 Mg Tablet      60 MG PO QDAY, #30 TAB 0 Refills         Reported         6/11/18       Meclizine Hcl (Meclizine*) 25 Mg Tablet      25 MG PO Q8 Y for VERTIGO, #30 TAB 1 Refill         Prov: Angeles Londono         5/31/18       lamoTRIgine (lamoTRIgine*) 150 Mg Tablet      150 MG PO BID, #30 TAB 0 Refills         Prov: Angeles Londono         5/31/18       Ondansetron HCl (Zofran) 8 Mg Tablet      8 MG PO Q8H Y for NAUSEA, #30 TAB 3 Refills         Prov: ZHAO ERVIN         5/25/18       Allopurinol (Zyloprim*) 300 Mg Tablet      300 MG PO QDAY for 30 Days, #30 TAB 0 Refills         Reported         5/11/18       Palbociclib (Ibrance) 125 Mg Capsule      125 MG PO QDAY for 21 Days, #21 CAP         Reported         5/11/18       Liraglutide (Victoza Inj) 0.1 Ml " Cartridge      1.8 MG SQ QDAY, EA         Reported         4/24/18       Acetaminophen/Hydrocodone 10/325* (Hydrocodone/Acetaminophen 10/325*) 1 Each     Tablet      1 TAB PO Q4-6H Y for PAIN, TAB         Reported         4/20/18       Docusate Sodium (Colace) 100 Mg Capsule      100 MG PO PRN, #60 CAP 0 Refills         Reported         1/18/18       Lovastatin (Lovastatin) 40 Mg Tablet      40 MG PO QDAY for 30 Days, #30 TAB         Reported         12/14/17       Morphine Sulfate (MS CONTIN) 15 Mg Tablet.er      15 MG PO TID, TAB.ER         Reported         12/14/17       MDI-Albuterol (Ventolin HFA*) 8 Gm Hfa.aer.ad      2 PUFF INH Q4HR Y for SHORTNESS OF BREATH, #1 MDI 0 Refills         Reported         9/25/17       sitaGLIPtin (Januvia*) 50 Mg Tablet      50 MG PO C BK, TAB         Reported         11/28/16       Ascorbic Acid (Vitamin C*) 500 Mg Tablet      500 MG PO QDAY, #60 TAB 0 Refills         Reported         3/1/16       Magnesium Oxide (Magnesium Oxide*) 400 Mg Tablet      400 MG PO BID, #60 TAB 1 Refill         Prov: ARDEN NUNEZ         1/30/16       Pentoxifylline (Pentoxifylline) 400 Mg Tabcr      400 MG PO BID, #90         Reported         8/31/15       Gabapentin (Gabapentin) 600 Mg Tablet      600 MG PO BID, #60         Reported         8/31/15       Aspirin EC (Aspirin EC*) 81 Mg Tabec      81 MG PO HS, #90         Reported         8/31/15       Metformin Hcl (Metformin Hcl*) 1,000 Mg Tablet      1000 MG PO BID, #180         Reported         8/31/15       Isosorbide Mononitrate ER (Isosorbide Mononitrate ER) 60 Mg Tab.er.24h      60 MG PO QAM, #30         Reported         8/31/15       Spironolactone (Spironolactone*) 25 Mg Tablet      25 MG PO QDAY, #30         Reported         8/31/15       Metoprolol Tartrate (Metoprolol Tartrate*) 100 Mg Tablet      100 MG PO BID, #60         Reported         8/31/15            General Information      Referring Provider:  VANESSA LONGORIA [facility]       Primary Provider:  LUCIANA LIANG [facility] Provider 2:  Deena Amado            Chemo Status      On Oral Chemotherapy?:  No            Other      Clinical Trial Participant?:  No            PAST, FAMILY   Past Medical History      Past Medical History:  No Diabetes Type 1, Yes Diabetes Type 2, No Thyroid     Disease, Yes COPD, No Emphysema, No Hypertension, No Stroke, No High Cholesterol    , No Heart Attack, No Bleeding Condition, No Low or High RBC Count, No Low or     High WBC Count, No Low or High Platelet Coun, No Hepatitis, No Kidney Disease,     No Depression, No Alzheimer's Disease, No Mental Disease, No Seizures, No     Arthritis, No Osteoporosis, No Osteopenia, No Short of Air, No Sleep apnea, No     Liver Disease, No STD, No Enlarged Prostate, No Other      Hematology/oncology:  REPORTS HX OF: Other cancer history (gull bladder),     DENIES HX OF: Previous Treatment for CA, Anemia, Bladder Cancer, Blood cancer,     Brain cancer, Breast cancer, Cervical cancer, Coagulopathy, Colorectal cancer,     Endocrine cancer, Eye cancer, GI cancer,  cancer, Kidney cancer, Leukemia,     Leukocytosis, Leukopenia, Liver cancer, Lung cancer, Lymphoma, Musculoskeletal     cancer, Myeloma, Neurologic cancer, Oral cancer, Ovarian cancer, Skin cancer,     Stomach cancer, Thrombocytopenia, Thyroid cancer, Uterine cancer, Other     hematologic history      Genetic/metabolic:  DENIES HX OF: Cystic fibrosis, Down syndrome, Other genetic     history, Other metabolic history            Past Surgical History      REPORTS HX OF: Thyroid surgery, Lung biopsy, Appendectomy, Lumpectomy (left),     Mastectomy, left, Biopsy, Other Past Surgical Hx (right knee surgery), DENIES     HX OF: Cataract extraction, CABG surgery, Coronary stent, Valve replacement,     Cholecystectomy, Splenectomy, Bladder surgery, Nephrectomy, Joint replacement,     Frature repair, Skin cancer removal, Melanoma excision, Spinal surgery, Breast      biopsy, Mastectomy, bilateral, Mastectomy, right, Hysterectomy, Peg Tube     Placement, VAD Placement            Family History      REPORTS HX OF: Colorectal cancer (uncle), Kidney Cancer (uncle), Lung cancer (    uncle/aunt), DENIES HX OF: Anemia, Blood disorders, Blood Cancer, Breast cancer    , Cervical cancer, Coagulopathy, Endocrine Cancer, Eye Cancer, GI Cancer,      Cancer, Leukemia, Leukocytosis, Leukopenia, Liver Cancer, Lymphoma, Melanoma,     Musculoskeletal Cancer, Myeloma, Neurologic Cancer, Oral Cancer, Ovarian cancer    , Prostate cancer, Skin Cancer, Stomach Cancer, Testicular Cancer,     Thrombocytopenia, Thyroid cancer, Uterine cancer, Other Cancer History, Other     Hematology History            Social History      Lives independently:  Yes            Tobacco Use      Tobacco status:  Former smoker      Smoking history:              Alcohol Use      Alcohol intake:  rarely      Counseling given:  None            Substance Use      Substance use:  Denies use            REVIEW OF SYSTEMS      General:  Complains of: Weight loss, Denies: Appetite change, Excessive sweating    , Fatigue, Fever, Night sweats, Weight gain, Other      Eyes:  Denies: Blurred vision, Corrective lenses, Diplopia, Eye irritation, Eye     pain, Eye redness, Spots in vision, Vision loss, Other      Ears, nose, mouth, throat:  Denies: Headache, Seizures, Visual Changes, Hearing     loss, Sinus Congestion, Hoarseness, Sore throat, Other      Cardiovascular:  Denies: Chest pain, Irregular heartbeat, Palpitations, Swollen     ankles/legs, Other      Respiratory:  Denies: Chest pain, Shortness of Air, Productive cough, Coughing     blood, Other      Gastrointestinal:  Complains of: Nausea, Other (no appetite), Denies: Vomiting,     Problem swallowing, Frequent heartburn, Constipation, Diarrhea, Tarry stools,     Bloody stools, Unable to control bowels      Kidney/Bladder:  Denies: Painful Urination, Change in urinary stream,  Blood in     urine, Incontinence, Frequent Urination, Decreased urine stream, Other      Musculoskeletal:  Denies: New Back pain, Leg Cramps, Painful Joints, Swollen     Joints, Muscle Pain, Muscle weakness, Other      Skin:  DENIES: Jaundice, Easy Bleeding, Lesions/changes in moles, Nail changes,     Skin Discoloration, Rash, Other      Neurological:  Denies: Dizziness, Fainting, Numbness\Tingling, Paralysis,     Seizures, Other      Psychiatric:  Complains of: AAO X 3, Denies: Anxiety, Panic attacks, Depression    , Memory loss, Other      Hematologic/lymphatic:  Denies: Bruising, Bleeding, Enlarged Lymph Nodes,     Recurrent infections, Other      Reproductive:  Denies Pregnant, Denies Menopause, Denies Still Menstruating,     Denies Heavy Periods, Denies Other            Vitals            Vital Signs              Date Time  Temp Pulse Resp B/P (MAP) Pulse Ox O2 Delivery O2 Flow Rate FiO2             5/9/18 09:20 97.6 71  105/53 96 ROOM AIR              General Appearance:  Alert, Oriented X3, Cooperative, No acute distress      Eyes:  Anicteric Sclerae, Moist Conjunctiva, PERRLA      HEENT:  Orophraynx clear, No Erythema, No Exudates, No Pallor, No Thrush      Neck:  Supple, Full ROM, No Carotid Bruits      Respiratory:  CTAB, No Diminished Breath, No Crackels      Breast\Chest:  Masses, Lumps, Other (left breast mass appears unchanged, skin     involvement noted)      Abdomen\Gastro:  Soft, No Masses      Cardio:  RRR, No Murmur, No, Peripheral Edema, Normal PMI      Skin:  Normal Temperature, Normal Tone, No Rash, lesions, ulcers      Psychiatric:  Appropriate Affect, Intact Judgement, AAO x3      Neuro:  Cranial Nerve II-XII Inta, No Focal Sensory Deficit      Genitourinary:  No Perez Catheter      Muscularskeletal:  Normal Gait and Station, Full ROM of extremeties      Extremities:  No Digital Cyanosis, No Digital Ischemia, Normal Gait and station    , No Weakness      Lymphatic:  No Cervical, No  Supraclavicular, No Infraclavicular, No Axillary            Lab Results      Laboratory Tests      5/25/18 10:12            PREVENTION      Hx Influenza Vaccination:  Yes      Date Influenza Vaccine Given:  Nov 1, 2017      2 or More Falls Past Year?:  No      Fall Past Year with Injury?:  No      Hx Pneumococcal Vaccination:  Yes      Encouraged to follow-up with:  PCP regarding preventative exams.      Chart initiated by      Ellie POLO                 Disclaimer: Converted document may not contain table formatting or lab diagrams. Please see [employer] for the authenticated document.